# Patient Record
Sex: FEMALE | Race: OTHER | Employment: OTHER | URBAN - METROPOLITAN AREA
[De-identification: names, ages, dates, MRNs, and addresses within clinical notes are randomized per-mention and may not be internally consistent; named-entity substitution may affect disease eponyms.]

---

## 2018-08-07 ENCOUNTER — HOSPITAL ENCOUNTER (OUTPATIENT)
Age: 63
Discharge: HOME | End: 2018-08-07
Attending: FAMILY MEDICINE
Payer: COMMERCIAL

## 2018-08-07 ENCOUNTER — APPOINTMENT (OUTPATIENT)
Dept: GENERAL RADIOLOGY | Age: 63
End: 2018-08-07
Attending: FAMILY MEDICINE
Payer: COMMERCIAL

## 2018-08-07 VITALS
SYSTOLIC BLOOD PRESSURE: 152 MMHG | DIASTOLIC BLOOD PRESSURE: 58 MMHG | TEMPERATURE: 98 F | HEART RATE: 66 BPM | OXYGEN SATURATION: 98 %

## 2018-08-07 DIAGNOSIS — R03.0 ELEVATED BLOOD PRESSURE READING: ICD-10-CM

## 2018-08-07 DIAGNOSIS — R20.2 PARESTHESIAS: Primary | ICD-10-CM

## 2018-08-07 DIAGNOSIS — F43.22 ADJUSTMENT DISORDER WITH ANXIETY: ICD-10-CM

## 2018-08-07 LAB
#LYMPHOCYTE IC: 2.5 X10ˆ3/UL (ref 0.9–3.2)
#MXD IC: 0.4 X10ˆ3/UL (ref 0.1–1)
#NEUTROPHIL IC: 3.7 X10ˆ3/UL (ref 1.3–6.7)
CREAT SERPL-MCNC: 0.6 MG/DL (ref 0.55–1.02)
DDIMER WHOLE BLOOD: <100 NG/ML DDU (ref ?–400)
GLUCOSE BLD-MCNC: 101 MG/DL (ref 70–99)
HCT IC: 37.3 % (ref 37–54)
HGB IC: 12.7 G/DL (ref 11.7–16)
ISTAT BUN: 10 MG/DL (ref 8–20)
ISTAT CHLORIDE: 100 MMOL/L (ref 101–111)
ISTAT HEMATOCRIT: 36 % (ref 34–50)
ISTAT IONIZED CALCIUM: 1.14 MMOL/L
ISTAT POTASSIUM: 3.2 MMOL/L (ref 3.6–5.1)
ISTAT SODIUM: 138 MMOL/L (ref 136–144)
ISTAT TROPONIN: <0.1 NG/ML (ref ?–0.1)
LYMPHOCYTES NFR BLD AUTO: 37.4 %
MCH IC: 31 PG (ref 27–33.2)
MCHC IC: 34 G/DL (ref 31–37)
MCV IC: 91 FL (ref 81–100)
MIXED CELL %: 5.5 %
NEUTROPHILS NFR BLD AUTO: 57.1 %
PLT IC: 216 X10ˆ3/UL (ref 150–450)
RBC IC: 4.1 X10ˆ6/UL (ref 3.8–5.1)
WBC IC: 6.6 X10ˆ3/UL (ref 4–13)

## 2018-08-07 PROCEDURE — 85025 COMPLETE CBC W/AUTO DIFF WBC: CPT | Performed by: FAMILY MEDICINE

## 2018-08-07 PROCEDURE — 36415 COLL VENOUS BLD VENIPUNCTURE: CPT

## 2018-08-07 PROCEDURE — 93005 ELECTROCARDIOGRAM TRACING: CPT

## 2018-08-07 PROCEDURE — 99204 OFFICE O/P NEW MOD 45 MIN: CPT

## 2018-08-07 PROCEDURE — 85378 FIBRIN DEGRADE SEMIQUANT: CPT | Performed by: FAMILY MEDICINE

## 2018-08-07 PROCEDURE — 93010 ELECTROCARDIOGRAM REPORT: CPT

## 2018-08-07 PROCEDURE — 99205 OFFICE O/P NEW HI 60 MIN: CPT

## 2018-08-07 PROCEDURE — 84484 ASSAY OF TROPONIN QUANT: CPT

## 2018-08-07 PROCEDURE — 80047 BASIC METABLC PNL IONIZED CA: CPT

## 2018-08-07 PROCEDURE — 71046 X-RAY EXAM CHEST 2 VIEWS: CPT | Performed by: FAMILY MEDICINE

## 2018-08-07 RX ORDER — LOSARTAN POTASSIUM AND HYDROCHLOROTHIAZIDE 25; 100 MG/1; MG/1
1 TABLET ORAL DAILY
Status: ON HOLD | COMMUNITY
End: 2020-07-04

## 2018-08-07 RX ORDER — ATORVASTATIN CALCIUM 20 MG/1
20 TABLET, FILM COATED ORAL NIGHTLY
COMMUNITY
End: 2021-07-21

## 2018-08-07 RX ORDER — SODIUM CHLORIDE 9 MG/ML
1000 INJECTION, SOLUTION INTRAVENOUS ONCE
Status: COMPLETED | OUTPATIENT
Start: 2018-08-07 | End: 2018-08-07

## 2018-08-07 RX ORDER — ONDANSETRON 4 MG/1
4 TABLET, ORALLY DISINTEGRATING ORAL ONCE
Status: COMPLETED | OUTPATIENT
Start: 2018-08-07 | End: 2018-08-07

## 2018-08-07 RX ORDER — POTASSIUM CHLORIDE 1500 MG/1
20 TABLET, FILM COATED, EXTENDED RELEASE ORAL DAILY
Qty: 5 TABLET | Refills: 0 | Status: SHIPPED | OUTPATIENT
Start: 2018-08-07 | End: 2018-08-12

## 2018-08-07 RX ORDER — POTASSIUM CHLORIDE 20 MEQ/1
40 TABLET, EXTENDED RELEASE ORAL ONCE
Status: COMPLETED | OUTPATIENT
Start: 2018-08-07 | End: 2018-08-07

## 2018-08-07 RX ORDER — ONDANSETRON 4 MG/1
4 TABLET, FILM COATED ORAL EVERY 6 HOURS PRN
Qty: 12 TABLET | Refills: 0 | Status: SHIPPED | OUTPATIENT
Start: 2018-08-07 | End: 2018-08-10

## 2018-08-07 NOTE — ED PROVIDER NOTES
Patient Seen in: [facility]    History   Patient presents with:  Blood Pressure  Nausea    Stated Complaint: high blood pressure    HPI  This is a 65-year-old female-Azeri-speaking only,  helping with the interview, Edward Zavala ) 176/80  Pulse: 79  Resp: n/a  Temp: 98.1 °F (36.7 °C)  Temp src: Oral  SpO2: 98 %  O2 Device: None (Room air)    Current:/58   Pulse 66   Temp 98.1 °F (36.7 °C) (Oral)   SpO2 98%         Physical Exam    General appearance: alert, appears stated Lisa                            High blood  Pressure - pt takes blood pressure medication x 10 yrs now. Pt takes blood pressure at home has  Been getting reading of               168/70,157/70.   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 FINDINGS:  LUNGS:  No focal consolidation. Normal vascularity. CARDIAC:  Normal size cardiac silhouette.  MEDIASTINUM:  Normal. PLEURA:  Normal.  No pl

## 2018-08-07 NOTE — ED INITIAL ASSESSMENT (HPI)
Omani speaking only-- interperter : Lisa    High blood  Pressure - pt takes blood pressure medication x 10 yrs now. Pt takes blood pressure at home has  Been getting reading of 168/70,157/70.   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

## 2018-08-07 NOTE — ED NOTES
kdur given, IV infusing well. Warm blanket  provided. Pt made comfortable. Bed on mid fowlers. Lights dimmed. 1 side rail up. Call light within reach.   Family at bedside

## 2018-08-08 LAB
ATRIAL RATE: 68 BPM
P AXIS: 23 DEGREES
P-R INTERVAL: 158 MS
Q-T INTERVAL: 424 MS
QRS DURATION: 80 MS
QTC CALCULATION (BEZET): 450 MS
R AXIS: 10 DEGREES
T AXIS: 17 DEGREES
VENTRICULAR RATE: 68 BPM

## 2019-02-20 ENCOUNTER — APPOINTMENT (OUTPATIENT)
Dept: GENERAL RADIOLOGY | Age: 64
End: 2019-02-20
Attending: EMERGENCY MEDICINE
Payer: COMMERCIAL

## 2019-02-20 ENCOUNTER — HOSPITAL ENCOUNTER (EMERGENCY)
Age: 64
Discharge: HOME | End: 2019-02-20
Attending: EMERGENCY MEDICINE
Payer: COMMERCIAL

## 2019-02-20 VITALS
DIASTOLIC BLOOD PRESSURE: 65 MMHG | SYSTOLIC BLOOD PRESSURE: 146 MMHG | RESPIRATION RATE: 16 BRPM | HEART RATE: 68 BPM | WEIGHT: 154 LBS | TEMPERATURE: 98 F | OXYGEN SATURATION: 99 %

## 2019-02-20 DIAGNOSIS — S39.012A STRAIN OF LUMBAR REGION, INITIAL ENCOUNTER: Primary | ICD-10-CM

## 2019-02-20 PROCEDURE — 72110 X-RAY EXAM L-2 SPINE 4/>VWS: CPT | Performed by: EMERGENCY MEDICINE

## 2019-02-20 PROCEDURE — 99284 EMERGENCY DEPT VISIT MOD MDM: CPT

## 2019-02-20 PROCEDURE — 99283 EMERGENCY DEPT VISIT LOW MDM: CPT

## 2019-02-20 RX ORDER — CYCLOBENZAPRINE HCL 10 MG
10 TABLET ORAL 3 TIMES DAILY PRN
Qty: 20 TABLET | Refills: 0 | Status: SHIPPED | OUTPATIENT
Start: 2019-02-20 | End: 2019-02-27

## 2019-02-20 RX ORDER — CYCLOBENZAPRINE HCL 10 MG
10 TABLET ORAL ONCE
Status: COMPLETED | OUTPATIENT
Start: 2019-02-20 | End: 2019-02-20

## 2019-02-20 RX ORDER — NAPROXEN 500 MG/1
500 TABLET ORAL 2 TIMES DAILY PRN
Qty: 20 TABLET | Refills: 0 | Status: SHIPPED | OUTPATIENT
Start: 2019-02-20 | End: 2019-02-27

## 2019-02-20 NOTE — ED PROVIDER NOTES
Patient Seen in: THE [facility] Emergency Department In [place]    History   Patient presents with:  Back Pain (musculoskeletal)    Stated Complaint: back pain     HPI    Patient is a 59-year-old female who presents with family for evaluation of diffuse low b normal and breath sounds normal.   Abdominal: Soft. Bowel sounds are normal.   Musculoskeletal: Normal range of motion. Mild diffuse tenderness palpation about the lumbar paraspinal muscles. No midline step-off or deformity.    Neurological: She is alert needed.   Qty: 20 tablet Refills: 0

## 2019-11-18 ENCOUNTER — APPOINTMENT (OUTPATIENT)
Dept: GENERAL RADIOLOGY | Facility: HOSPITAL | Age: 64
End: 2019-11-18
Attending: EMERGENCY MEDICINE
Payer: COMMERCIAL

## 2019-11-18 ENCOUNTER — HOSPITAL ENCOUNTER (EMERGENCY)
Facility: HOSPITAL | Age: 64
Discharge: HOME | End: 2019-11-18
Attending: EMERGENCY MEDICINE
Payer: COMMERCIAL

## 2019-11-18 ENCOUNTER — APPOINTMENT (OUTPATIENT)
Dept: CT IMAGING | Facility: HOSPITAL | Age: 64
End: 2019-11-18
Attending: EMERGENCY MEDICINE
Payer: COMMERCIAL

## 2019-11-18 VITALS
HEIGHT: 61 IN | WEIGHT: 150 LBS | SYSTOLIC BLOOD PRESSURE: 151 MMHG | HEART RATE: 63 BPM | BODY MASS INDEX: 28.32 KG/M2 | TEMPERATURE: 98 F | DIASTOLIC BLOOD PRESSURE: 69 MMHG | OXYGEN SATURATION: 95 % | RESPIRATION RATE: 16 BRPM

## 2019-11-18 DIAGNOSIS — M54.12 CERVICAL RADICULOPATHY: Primary | ICD-10-CM

## 2019-11-18 PROCEDURE — 99285 EMERGENCY DEPT VISIT HI MDM: CPT

## 2019-11-18 PROCEDURE — 70450 CT HEAD/BRAIN W/O DYE: CPT | Performed by: EMERGENCY MEDICINE

## 2019-11-18 PROCEDURE — 84484 ASSAY OF TROPONIN QUANT: CPT | Performed by: EMERGENCY MEDICINE

## 2019-11-18 PROCEDURE — 93010 ELECTROCARDIOGRAM REPORT: CPT

## 2019-11-18 PROCEDURE — 72050 X-RAY EXAM NECK SPINE 4/5VWS: CPT | Performed by: EMERGENCY MEDICINE

## 2019-11-18 PROCEDURE — 80053 COMPREHEN METABOLIC PANEL: CPT | Performed by: EMERGENCY MEDICINE

## 2019-11-18 PROCEDURE — 93005 ELECTROCARDIOGRAM TRACING: CPT

## 2019-11-18 PROCEDURE — 96374 THER/PROPH/DIAG INJ IV PUSH: CPT

## 2019-11-18 PROCEDURE — 85025 COMPLETE CBC W/AUTO DIFF WBC: CPT | Performed by: EMERGENCY MEDICINE

## 2019-11-18 RX ORDER — KETOROLAC TROMETHAMINE 30 MG/ML
15 INJECTION, SOLUTION INTRAMUSCULAR; INTRAVENOUS ONCE
Status: COMPLETED | OUTPATIENT
Start: 2019-11-18 | End: 2019-11-18

## 2019-11-18 RX ORDER — TRAMADOL HYDROCHLORIDE 50 MG/1
TABLET ORAL EVERY 6 HOURS PRN
Qty: 10 TABLET | Refills: 0 | Status: SHIPPED | OUTPATIENT
Start: 2019-11-18 | End: 2019-11-25

## 2019-11-18 NOTE — ED PROVIDER NOTES
Patient Seen in: [facility] Emergency Department      History   Patient presents with:  Pain (neurologic)    Stated Complaint: pain to left jaw since Waldron Escort is constant, pain never goes away.   seen by*    HPI    And is a pleasant 76-year-old H SpO2 95%   BMI 28.34 kg/m²         Physical Exam    Alert and oriented patient appears in no distress HEENT exam is normal lungs are clear cardiovascular exam shows regular rhythm abdomen soft nontender extremities no cyanosis or edema no vertebral tendern Marget Babinski, [address]   [facility] [address] [phone]    In 3 days          Medications Prescribed:  Current Discharge Medication List    START taking these medications    traMADol HCl 50 MG Oral Tab  Take 1-2 tablets ( mg total) by mouth every 6

## 2019-11-18 NOTE — ED NOTES
Patient taken to CT department at this time by cart by PCT. Will try to facilitate patient going directly from CT to xray.

## 2019-11-18 NOTE — ED NOTES
After CT scan, patient with increase left neck pain, warm pack applied to left neck area. Patient tolerated well.

## 2019-11-18 NOTE — ED INITIAL ASSESSMENT (HPI)
Pt with left sided back of head/ neck pain, radiates down my left arm. Sensation left sided jaw feels seperated, symptoms for last month. Today pain more severe. When pain is severe pt feels nauseous, no vomiting.

## 2019-11-18 NOTE — ED NOTES
For past month or so, patient with back of neck left side going down left shoulder all the way to all left fingers with numbness and tingling. No numbness or tingling now. Today with left side neck and left shoulder pain.   Also, occasional left jaw pain,

## 2019-11-26 ENCOUNTER — APPOINTMENT (OUTPATIENT)
Dept: GENERAL RADIOLOGY | Age: 64
End: 2019-11-26
Payer: COMMERCIAL

## 2019-11-26 ENCOUNTER — HOSPITAL ENCOUNTER (OUTPATIENT)
Age: 64
Discharge: HOME | End: 2019-11-26
Payer: COMMERCIAL

## 2019-11-26 VITALS
HEART RATE: 78 BPM | SYSTOLIC BLOOD PRESSURE: 146 MMHG | RESPIRATION RATE: 20 BRPM | TEMPERATURE: 99 F | OXYGEN SATURATION: 98 % | DIASTOLIC BLOOD PRESSURE: 78 MMHG

## 2019-11-26 DIAGNOSIS — S93.402A MODERATE LEFT ANKLE SPRAIN, INITIAL ENCOUNTER: Primary | ICD-10-CM

## 2019-11-26 PROCEDURE — 99213 OFFICE O/P EST LOW 20 MIN: CPT

## 2019-11-26 PROCEDURE — 73610 X-RAY EXAM OF ANKLE: CPT

## 2019-11-26 PROCEDURE — 73630 X-RAY EXAM OF FOOT: CPT

## 2019-11-26 PROCEDURE — 99214 OFFICE O/P EST MOD 30 MIN: CPT

## 2019-11-26 RX ORDER — TRAMADOL HYDROCHLORIDE 50 MG/1
50 TABLET ORAL EVERY 6 HOURS PRN
COMMUNITY
End: 2020-06-11

## 2019-11-26 NOTE — ED INITIAL ASSESSMENT (HPI)
Pt was going down step ladder, slipped and fell to ground; pain and swelling to L ankle; pt had surgery to L ankle a few years ago; also with pain to tailbone; pt hit head slightly but no pain/swelling/loc/vom

## 2019-11-27 NOTE — ED PROVIDER NOTES
Patient Seen in: [facility] In [facility] END      History   Patient presents with:  Lower Extremity Injury (musculoskeletal)    Stated Complaint: LEFT ANKLE INJURY S/P FALL TODAY    AG    Rose Armando is a 43-year-old female who presents today for evalu SpO2 98 %   O2 Device None (Room air)       Current:/78   Pulse 78   Temp 98.7 °F (37.1 °C) (Temporal)   Resp 20   SpO2 98%         Physical Exam  Nursing note reviewed. Vital signs reviewed.   General: A&O x 3; well-developed; well-nourished; no ac described above. 2. Mild underlying tibiotalar joint osteoarthritis. 3. Mild calcaneal enthesopathy. 4. No acute process is identified. Dictated by: Donny Schilder, DO on 11/26/2019 at 17:44     Approved by:  Donny Schilder, DO on 11/26/2019 at 17:46 MD [address]   [facility] [address]  [address] [facility], [address] [place] [address]  Idris Ly [facility] [address]  [phone]              Medications Prescribed:  Discharge Medication List as of 11/26/2019  6:26 PM

## 2019-12-04 ENCOUNTER — HOSPITAL ENCOUNTER (OUTPATIENT)
Dept: MRI IMAGING | Age: 64
Discharge: HOME | End: 2019-12-04
Attending: NEUROLOGICAL SURGERY
Payer: COMMERCIAL

## 2019-12-04 DIAGNOSIS — M50.220 OTHER CERVICAL DISC DISPLACEMENT, MID-CERVICAL REGION, UNSPECIFIED LEVEL: ICD-10-CM

## 2019-12-04 DIAGNOSIS — M54.2 CERVICALGIA: ICD-10-CM

## 2019-12-04 PROCEDURE — 72141 MRI NECK SPINE W/O DYE: CPT | Performed by: NEUROLOGICAL SURGERY

## 2020-03-02 ENCOUNTER — HOSPITAL ENCOUNTER (OUTPATIENT)
Dept: CT IMAGING | Age: 65
Discharge: HOME | End: 2020-03-02
Attending: NEUROLOGICAL SURGERY
Payer: COMMERCIAL

## 2020-03-02 DIAGNOSIS — M47.22 CERVICAL SPONDYLOSIS WITH RADICULOPATHY: ICD-10-CM

## 2020-03-02 PROCEDURE — 72125 CT NECK SPINE W/O DYE: CPT | Performed by: NEUROLOGICAL SURGERY

## 2020-06-12 ENCOUNTER — APPOINTMENT (OUTPATIENT)
Dept: LAB | Age: 65
End: 2020-06-12
Attending: NEUROLOGICAL SURGERY
Payer: COMMERCIAL

## 2020-06-12 DIAGNOSIS — Z01.818 PREOP TESTING: ICD-10-CM

## 2020-06-12 PROCEDURE — 87641 MR-STAPH DNA AMP PROBE: CPT

## 2020-06-13 ENCOUNTER — LAB ENCOUNTER (OUTPATIENT)
Dept: LAB | Facility: HOSPITAL | Age: 65
End: 2020-06-13
Attending: NEUROLOGICAL SURGERY
Payer: COMMERCIAL

## 2020-06-13 DIAGNOSIS — Z01.818 PREOP TESTING: ICD-10-CM

## 2020-06-15 ENCOUNTER — ANESTHESIA (OUTPATIENT)
Dept: SURGERY | Facility: HOSPITAL | Age: 65
End: 2020-06-15
Payer: COMMERCIAL

## 2020-06-15 ENCOUNTER — ANESTHESIA EVENT (OUTPATIENT)
Dept: SURGERY | Facility: HOSPITAL | Age: 65
End: 2020-06-15
Payer: COMMERCIAL

## 2020-06-15 ENCOUNTER — HOSPITAL ENCOUNTER (OUTPATIENT)
Facility: HOSPITAL | Age: 65
Discharge: HOME | End: 2020-06-16
Attending: NEUROLOGICAL SURGERY | Admitting: NEUROLOGICAL SURGERY
Payer: COMMERCIAL

## 2020-06-15 ENCOUNTER — APPOINTMENT (OUTPATIENT)
Dept: GENERAL RADIOLOGY | Facility: HOSPITAL | Age: 65
End: 2020-06-15
Attending: NEUROLOGICAL SURGERY
Payer: COMMERCIAL

## 2020-06-15 DIAGNOSIS — Z01.818 PREOP TESTING: Primary | ICD-10-CM

## 2020-06-15 PROBLEM — E78.5 HYPERLIPIDEMIA: Chronic | Status: ACTIVE | Noted: 2020-06-15

## 2020-06-15 PROBLEM — I10 ESSENTIAL HYPERTENSION: Chronic | Status: ACTIVE | Noted: 2020-06-15

## 2020-06-15 PROBLEM — M54.12 CERVICAL RADICULOPATHY: Status: ACTIVE | Noted: 2020-06-15

## 2020-06-15 PROCEDURE — 0RG10K1 FUSION OF CERVICAL VERTEBRAL JOINT WITH NONAUTOLOGOUS TISSUE SUBSTITUTE, POSTERIOR APPROACH, POSTERIOR COLUMN, OPEN APPROACH: ICD-10-PCS | Performed by: NEUROLOGICAL SURGERY

## 2020-06-15 PROCEDURE — 99202 OFFICE O/P NEW SF 15 MIN: CPT | Performed by: HOSPITALIST

## 2020-06-15 PROCEDURE — 76000 FLUOROSCOPY <1 HR PHYS/QHP: CPT | Performed by: NEUROLOGICAL SURGERY

## 2020-06-15 PROCEDURE — 0RG10AJ FUSION OF CERVICAL VERTEBRAL JOINT WITH INTERBODY FUSION DEVICE, POSTERIOR APPROACH, ANTERIOR COLUMN, OPEN APPROACH: ICD-10-PCS | Performed by: NEUROLOGICAL SURGERY

## 2020-06-15 DEVICE — OSTEOCEL PRO SMALL: Type: IMPLANTABLE DEVICE | Site: SPINE CERVICAL | Status: FUNCTIONAL

## 2020-06-15 DEVICE — THE ALLY BONE SCREW IS A SINGLE-USE IMPLANT MADE OF TITANIUM ALLOY AND USED IN BONE RECONSTRUCTION, OSTEOTOMY, ARTHRODESIS, JOIN FUSION, FRACTURE REPAIR AND FIXATION APPROPRIATE FOR THE SIZE OF THE DEVICE.
Type: IMPLANTABLE DEVICE | Site: SPINE CERVICAL | Status: FUNCTIONAL
Brand: ALLY BONE SCREW

## 2020-06-15 DEVICE — THE DTRAX CERVICAL CAGE-B IS A SINGLE-USE, TITANIUM ALLOY INTERVERTEBRAL IMPLANT AVAILABLE IN VARIOUS FOOTPRINTS AND HEIGHTS. IT IS INTENDED TO BE USED IN CERVICAL SPINAL FUSION SURGERY FOR SKELETALLY MATURE PATIENTS WITH DEGENERATIVE DISC DISEASE OF THE CERVICAL SPINE WITH ACCOMPANYING RADICULAR SYMPTOMS AT ONE DISC LEVEL.
Type: IMPLANTABLE DEVICE | Site: SPINE CERVICAL | Status: FUNCTIONAL
Brand: CAVUX  CERVICAL CAGE-B

## 2020-06-15 RX ORDER — EPHEDRINE SULFATE 50 MG/ML
INJECTION, SOLUTION INTRAVENOUS AS NEEDED
Status: DISCONTINUED | OUTPATIENT
Start: 2020-06-15 | End: 2020-06-15 | Stop reason: SURG

## 2020-06-15 RX ORDER — MIDAZOLAM HYDROCHLORIDE 1 MG/ML
INJECTION INTRAMUSCULAR; INTRAVENOUS AS NEEDED
Status: DISCONTINUED | OUTPATIENT
Start: 2020-06-15 | End: 2020-06-15 | Stop reason: SURG

## 2020-06-15 RX ORDER — HALOPERIDOL 5 MG/ML
0.25 INJECTION INTRAMUSCULAR ONCE AS NEEDED
Status: DISCONTINUED | OUTPATIENT
Start: 2020-06-15 | End: 2020-06-15 | Stop reason: HOSPADM

## 2020-06-15 RX ORDER — ONDANSETRON 2 MG/ML
4 INJECTION INTRAMUSCULAR; INTRAVENOUS EVERY 4 HOURS PRN
Status: DISPENSED | OUTPATIENT
Start: 2020-06-15 | End: 2020-06-16

## 2020-06-15 RX ORDER — ONDANSETRON 2 MG/ML
4 INJECTION INTRAMUSCULAR; INTRAVENOUS ONCE AS NEEDED
Status: DISCONTINUED | OUTPATIENT
Start: 2020-06-15 | End: 2020-06-15 | Stop reason: HOSPADM

## 2020-06-15 RX ORDER — ROCURONIUM BROMIDE 10 MG/ML
INJECTION, SOLUTION INTRAVENOUS AS NEEDED
Status: DISCONTINUED | OUTPATIENT
Start: 2020-06-15 | End: 2020-06-15 | Stop reason: SURG

## 2020-06-15 RX ORDER — MORPHINE SULFATE 15 MG/1
15 TABLET ORAL EVERY 4 HOURS PRN
Status: DISCONTINUED | OUTPATIENT
Start: 2020-06-15 | End: 2020-06-16

## 2020-06-15 RX ORDER — LIDOCAINE HYDROCHLORIDE 10 MG/ML
INJECTION, SOLUTION EPIDURAL; INFILTRATION; INTRACAUDAL; PERINEURAL AS NEEDED
Status: DISCONTINUED | OUTPATIENT
Start: 2020-06-15 | End: 2020-06-15 | Stop reason: SURG

## 2020-06-15 RX ORDER — NALOXONE HYDROCHLORIDE 0.4 MG/ML
80 INJECTION, SOLUTION INTRAMUSCULAR; INTRAVENOUS; SUBCUTANEOUS AS NEEDED
Status: DISCONTINUED | OUTPATIENT
Start: 2020-06-15 | End: 2020-06-15 | Stop reason: HOSPADM

## 2020-06-15 RX ORDER — CEFAZOLIN SODIUM/WATER 2 G/20 ML
2 SYRINGE (ML) INTRAVENOUS EVERY 8 HOURS
Status: DISCONTINUED | OUTPATIENT
Start: 2020-06-15 | End: 2020-06-15 | Stop reason: HOSPADM

## 2020-06-15 RX ORDER — METOCLOPRAMIDE HYDROCHLORIDE 5 MG/ML
10 INJECTION INTRAMUSCULAR; INTRAVENOUS EVERY 6 HOURS PRN
Status: DISCONTINUED | OUTPATIENT
Start: 2020-06-15 | End: 2020-06-16

## 2020-06-15 RX ORDER — HYDROMORPHONE HYDROCHLORIDE 1 MG/ML
0.8 INJECTION, SOLUTION INTRAMUSCULAR; INTRAVENOUS; SUBCUTANEOUS EVERY 2 HOUR PRN
Status: DISCONTINUED | OUTPATIENT
Start: 2020-06-15 | End: 2020-06-16

## 2020-06-15 RX ORDER — FAMOTIDINE 20 MG/1
20 TABLET ORAL ONCE
Status: DISCONTINUED | OUTPATIENT
Start: 2020-06-15 | End: 2020-06-15 | Stop reason: HOSPADM

## 2020-06-15 RX ORDER — HYDROCODONE BITARTRATE AND ACETAMINOPHEN 5; 325 MG/1; MG/1
1 TABLET ORAL AS NEEDED
Status: DISCONTINUED | OUTPATIENT
Start: 2020-06-15 | End: 2020-06-15 | Stop reason: HOSPADM

## 2020-06-15 RX ORDER — ACETAMINOPHEN 500 MG
1000 TABLET ORAL ONCE
Status: COMPLETED | OUTPATIENT
Start: 2020-06-15 | End: 2020-06-15

## 2020-06-15 RX ORDER — ONDANSETRON 2 MG/ML
INJECTION INTRAMUSCULAR; INTRAVENOUS AS NEEDED
Status: DISCONTINUED | OUTPATIENT
Start: 2020-06-15 | End: 2020-06-15 | Stop reason: SURG

## 2020-06-15 RX ORDER — BISACODYL 10 MG
10 SUPPOSITORY, RECTAL RECTAL
Status: DISCONTINUED | OUTPATIENT
Start: 2020-06-15 | End: 2020-06-16

## 2020-06-15 RX ORDER — SODIUM PHOSPHATE, DIBASIC AND SODIUM PHOSPHATE, MONOBASIC 7; 19 G/133ML; G/133ML
1 ENEMA RECTAL ONCE AS NEEDED
Status: DISCONTINUED | OUTPATIENT
Start: 2020-06-15 | End: 2020-06-16

## 2020-06-15 RX ORDER — HYDROMORPHONE HYDROCHLORIDE 1 MG/ML
0.2 INJECTION, SOLUTION INTRAMUSCULAR; INTRAVENOUS; SUBCUTANEOUS EVERY 5 MIN PRN
Status: DISCONTINUED | OUTPATIENT
Start: 2020-06-15 | End: 2020-06-15 | Stop reason: HOSPADM

## 2020-06-15 RX ORDER — DIPHENHYDRAMINE HCL 25 MG
25 CAPSULE ORAL EVERY 4 HOURS PRN
Status: DISCONTINUED | OUTPATIENT
Start: 2020-06-15 | End: 2020-06-16

## 2020-06-15 RX ORDER — DIAPER,BRIEF,INFANT-TODD,DISP
EACH MISCELLANEOUS AS NEEDED
Status: DISCONTINUED | OUTPATIENT
Start: 2020-06-15 | End: 2020-06-15 | Stop reason: HOSPADM

## 2020-06-15 RX ORDER — MORPHINE SULFATE 4 MG/ML
4 INJECTION, SOLUTION INTRAMUSCULAR; INTRAVENOUS EVERY 10 MIN PRN
Status: DISCONTINUED | OUTPATIENT
Start: 2020-06-15 | End: 2020-06-15 | Stop reason: HOSPADM

## 2020-06-15 RX ORDER — DEXAMETHASONE SODIUM PHOSPHATE 4 MG/ML
VIAL (ML) INJECTION AS NEEDED
Status: DISCONTINUED | OUTPATIENT
Start: 2020-06-15 | End: 2020-06-15 | Stop reason: SURG

## 2020-06-15 RX ORDER — HYDROMORPHONE HYDROCHLORIDE 1 MG/ML
0.4 INJECTION, SOLUTION INTRAMUSCULAR; INTRAVENOUS; SUBCUTANEOUS EVERY 5 MIN PRN
Status: DISCONTINUED | OUTPATIENT
Start: 2020-06-15 | End: 2020-06-15 | Stop reason: HOSPADM

## 2020-06-15 RX ORDER — DIPHENHYDRAMINE HYDROCHLORIDE 50 MG/ML
25 INJECTION INTRAMUSCULAR; INTRAVENOUS EVERY 4 HOURS PRN
Status: DISCONTINUED | OUTPATIENT
Start: 2020-06-15 | End: 2020-06-16

## 2020-06-15 RX ORDER — HYDROMORPHONE HYDROCHLORIDE 1 MG/ML
0.6 INJECTION, SOLUTION INTRAMUSCULAR; INTRAVENOUS; SUBCUTANEOUS EVERY 5 MIN PRN
Status: DISCONTINUED | OUTPATIENT
Start: 2020-06-15 | End: 2020-06-15 | Stop reason: HOSPADM

## 2020-06-15 RX ORDER — SODIUM CHLORIDE, SODIUM LACTATE, POTASSIUM CHLORIDE, CALCIUM CHLORIDE 600; 310; 30; 20 MG/100ML; MG/100ML; MG/100ML; MG/100ML
INJECTION, SOLUTION INTRAVENOUS CONTINUOUS
Status: DISCONTINUED | OUTPATIENT
Start: 2020-06-15 | End: 2020-06-15

## 2020-06-15 RX ORDER — HYDROMORPHONE HYDROCHLORIDE 1 MG/ML
0.4 INJECTION, SOLUTION INTRAMUSCULAR; INTRAVENOUS; SUBCUTANEOUS EVERY 2 HOUR PRN
Status: DISCONTINUED | OUTPATIENT
Start: 2020-06-15 | End: 2020-06-16

## 2020-06-15 RX ORDER — SENNOSIDES 8.6 MG
17.2 TABLET ORAL NIGHTLY
Status: DISCONTINUED | OUTPATIENT
Start: 2020-06-15 | End: 2020-06-16

## 2020-06-15 RX ORDER — MORPHINE SULFATE 10 MG/ML
6 INJECTION, SOLUTION INTRAMUSCULAR; INTRAVENOUS EVERY 10 MIN PRN
Status: DISCONTINUED | OUTPATIENT
Start: 2020-06-15 | End: 2020-06-15 | Stop reason: HOSPADM

## 2020-06-15 RX ORDER — DOCUSATE SODIUM 100 MG/1
100 CAPSULE, LIQUID FILLED ORAL 2 TIMES DAILY
Status: DISCONTINUED | OUTPATIENT
Start: 2020-06-15 | End: 2020-06-16

## 2020-06-15 RX ORDER — HYDROMORPHONE HYDROCHLORIDE 1 MG/ML
0.2 INJECTION, SOLUTION INTRAMUSCULAR; INTRAVENOUS; SUBCUTANEOUS EVERY 2 HOUR PRN
Status: DISCONTINUED | OUTPATIENT
Start: 2020-06-15 | End: 2020-06-16

## 2020-06-15 RX ORDER — METOCLOPRAMIDE 10 MG/1
10 TABLET ORAL ONCE
Status: DISCONTINUED | OUTPATIENT
Start: 2020-06-15 | End: 2020-06-15 | Stop reason: HOSPADM

## 2020-06-15 RX ORDER — MORPHINE SULFATE 4 MG/ML
2 INJECTION, SOLUTION INTRAMUSCULAR; INTRAVENOUS EVERY 10 MIN PRN
Status: DISCONTINUED | OUTPATIENT
Start: 2020-06-15 | End: 2020-06-15 | Stop reason: HOSPADM

## 2020-06-15 RX ORDER — ATORVASTATIN CALCIUM 20 MG/1
20 TABLET, FILM COATED ORAL NIGHTLY
Status: DISCONTINUED | OUTPATIENT
Start: 2020-06-15 | End: 2020-06-16

## 2020-06-15 RX ORDER — NEOSTIGMINE METHYLSULFATE 1 MG/ML
INJECTION INTRAVENOUS AS NEEDED
Status: DISCONTINUED | OUTPATIENT
Start: 2020-06-15 | End: 2020-06-15 | Stop reason: SURG

## 2020-06-15 RX ORDER — SODIUM CHLORIDE, SODIUM LACTATE, POTASSIUM CHLORIDE, CALCIUM CHLORIDE 600; 310; 30; 20 MG/100ML; MG/100ML; MG/100ML; MG/100ML
INJECTION, SOLUTION INTRAVENOUS CONTINUOUS
Status: DISCONTINUED | OUTPATIENT
Start: 2020-06-15 | End: 2020-06-15 | Stop reason: HOSPADM

## 2020-06-15 RX ORDER — GLYCOPYRROLATE 0.2 MG/ML
INJECTION, SOLUTION INTRAMUSCULAR; INTRAVENOUS AS NEEDED
Status: DISCONTINUED | OUTPATIENT
Start: 2020-06-15 | End: 2020-06-15 | Stop reason: SURG

## 2020-06-15 RX ORDER — HYDROCODONE BITARTRATE AND ACETAMINOPHEN 5; 325 MG/1; MG/1
2 TABLET ORAL AS NEEDED
Status: DISCONTINUED | OUTPATIENT
Start: 2020-06-15 | End: 2020-06-15 | Stop reason: HOSPADM

## 2020-06-15 RX ORDER — OXYCODONE HYDROCHLORIDE AND ACETAMINOPHEN 5; 325 MG/1; MG/1
1 TABLET ORAL EVERY 4 HOURS PRN
Status: DISCONTINUED | OUTPATIENT
Start: 2020-06-15 | End: 2020-06-16

## 2020-06-15 RX ORDER — POLYETHYLENE GLYCOL 3350 17 G/17G
17 POWDER, FOR SOLUTION ORAL DAILY PRN
Status: DISCONTINUED | OUTPATIENT
Start: 2020-06-15 | End: 2020-06-16

## 2020-06-15 RX ORDER — PROCHLORPERAZINE EDISYLATE 5 MG/ML
5 INJECTION INTRAMUSCULAR; INTRAVENOUS ONCE AS NEEDED
Status: DISCONTINUED | OUTPATIENT
Start: 2020-06-15 | End: 2020-06-15 | Stop reason: HOSPADM

## 2020-06-15 RX ORDER — BUPIVACAINE HYDROCHLORIDE 2.5 MG/ML
INJECTION, SOLUTION EPIDURAL; INFILTRATION; INTRACAUDAL AS NEEDED
Status: DISCONTINUED | OUTPATIENT
Start: 2020-06-15 | End: 2020-06-15 | Stop reason: HOSPADM

## 2020-06-15 RX ORDER — SODIUM CHLORIDE 9 MG/ML
INJECTION, SOLUTION INTRAVENOUS CONTINUOUS
Status: DISCONTINUED | OUTPATIENT
Start: 2020-06-15 | End: 2020-06-16

## 2020-06-15 RX ORDER — OXYCODONE HYDROCHLORIDE AND ACETAMINOPHEN 5; 325 MG/1; MG/1
2 TABLET ORAL EVERY 4 HOURS PRN
Status: DISCONTINUED | OUTPATIENT
Start: 2020-06-15 | End: 2020-06-16

## 2020-06-15 RX ADMIN — DEXAMETHASONE SODIUM PHOSPHATE 6 MG: 4 MG/ML VIAL (ML) INJECTION at 11:03:00

## 2020-06-15 RX ADMIN — DEXAMETHASONE SODIUM PHOSPHATE 4 MG: 4 MG/ML VIAL (ML) INJECTION at 07:39:00

## 2020-06-15 RX ADMIN — EPHEDRINE SULFATE 5 MG: 50 INJECTION, SOLUTION INTRAVENOUS at 08:25:00

## 2020-06-15 RX ADMIN — ONDANSETRON 4 MG: 2 INJECTION INTRAMUSCULAR; INTRAVENOUS at 07:39:00

## 2020-06-15 RX ADMIN — EPHEDRINE SULFATE 5 MG: 50 INJECTION, SOLUTION INTRAVENOUS at 10:43:00

## 2020-06-15 RX ADMIN — ROCURONIUM BROMIDE 10 MG: 10 INJECTION, SOLUTION INTRAVENOUS at 08:43:00

## 2020-06-15 RX ADMIN — SODIUM CHLORIDE, SODIUM LACTATE, POTASSIUM CHLORIDE, CALCIUM CHLORIDE: 600; 310; 30; 20 INJECTION, SOLUTION INTRAVENOUS at 11:37:00

## 2020-06-15 RX ADMIN — ROCURONIUM BROMIDE 10 MG: 10 INJECTION, SOLUTION INTRAVENOUS at 09:52:00

## 2020-06-15 RX ADMIN — LIDOCAINE HYDROCHLORIDE 50 MG: 10 INJECTION, SOLUTION EPIDURAL; INFILTRATION; INTRACAUDAL; PERINEURAL at 07:39:00

## 2020-06-15 RX ADMIN — GLYCOPYRROLATE 0.8 MG: 0.2 INJECTION, SOLUTION INTRAMUSCULAR; INTRAVENOUS at 11:14:00

## 2020-06-15 RX ADMIN — CEFAZOLIN SODIUM/WATER 2 G: 2 G/20 ML SYRINGE (ML) INTRAVENOUS at 07:49:00

## 2020-06-15 RX ADMIN — GLYCOPYRROLATE 0.2 MG: 0.2 INJECTION, SOLUTION INTRAMUSCULAR; INTRAVENOUS at 07:39:00

## 2020-06-15 RX ADMIN — MIDAZOLAM HYDROCHLORIDE 2 MG: 1 INJECTION INTRAMUSCULAR; INTRAVENOUS at 07:37:00

## 2020-06-15 RX ADMIN — ROCURONIUM BROMIDE 10 MG: 10 INJECTION, SOLUTION INTRAVENOUS at 10:40:00

## 2020-06-15 RX ADMIN — NEOSTIGMINE METHYLSULFATE 5 MG: 1 INJECTION INTRAVENOUS at 11:14:00

## 2020-06-15 RX ADMIN — ROCURONIUM BROMIDE 50 MG: 10 INJECTION, SOLUTION INTRAVENOUS at 07:39:00

## 2020-06-15 RX ADMIN — EPHEDRINE SULFATE 5 MG: 50 INJECTION, SOLUTION INTRAVENOUS at 08:27:00

## 2020-06-15 RX ADMIN — SODIUM CHLORIDE, SODIUM LACTATE, POTASSIUM CHLORIDE, CALCIUM CHLORIDE: 600; 310; 30; 20 INJECTION, SOLUTION INTRAVENOUS at 10:53:00

## 2020-06-15 NOTE — ANESTHESIA PREPROCEDURE EVALUATION
Anesthesia PreOp Note    HPI:     Paula Muller is a 76year old female who presents for preoperative consultation requested by: Ancelmo Tanner MD    Date of Surgery: 6/15/2020    Procedure(s):  POSTERIOR CERVICAL FUSION BG 1 LEVEL  Indication: spondy Number of children: Not on file      Years of education: Not on file      Highest education level: Not on file    Occupational History      Not on file    Social Needs      Financial resource strain: Not on file      Food insecurity:        Worry: Not on II  TM distance: >3 FB  Neck ROM: full  Dental - normal exam     Pulmonary - normal exam   Cardiovascular - normal exam  (+) hypertension,     ECG reviewed    Neuro/Psych      GI/Hepatic/Renal      Endo/Other    (+) arthritis  Abdominal                Anes

## 2020-06-15 NOTE — ANESTHESIA PROCEDURE NOTES
Peripheral IV  Date/Time: 6/15/2020 7:45 AM  Inserted by: Rhonda Figueroa CRNA    Placement  Needle size: 20 G  Laterality: left  Location: hand  Local anesthetic: none  Site prep: alcohol  Technique: anatomical landmarks  Attempts: 1

## 2020-06-15 NOTE — PROGRESS NOTES
[facility] HOSP - [facility]    Progress Note    [place] Han Patient Status:  Hospital Outpatient Surgery    1955 MRN [id number]   Location [address] UNIT Attending Salud Michaud MD   [address] Day # 0 PCP Olvin Womack Results:     Lab Results   Component Value Date    WBC 5.4 11/18/2019    HGB 12.6 11/18/2019    HCT 36.0 11/18/2019    .0 11/18/2019    CREATSERUM 0.88 11/18/2019    BUN 15 11/18/2019     (L) 11/18/2019    K 3.5 11/18/2019     11/18/20

## 2020-06-15 NOTE — ANESTHESIA POSTPROCEDURE EVALUATION
Patient: Jeff Black    Procedure Summary     Date:  06/15/20 Room / Location:  [facility] OR  / [facility] OR    Anesthesia Start:  5144 Anesthesia Stop:      Procedure:  POSTERIOR CERVICAL FUSION BG 1 LEVEL (N/A Spine Cervical) Diagnosis:  (spondylolisthe

## 2020-06-15 NOTE — OPERATIVE REPORT
[facility]    PATIENT'S NAME: [email or web address], XIMENA GRANADOS   ATTENDING PHYSICIAN: Aristides Adams MD   OPERATING PHYSICIAN: Jax Lynch MD   PATIENT ACCOUNT#:   [id number]    LOCATION:  [facility] [address] PACU 3 [facility] 10  MEDICAL RECORD #:   [id number] her questions were answered. OPERATIVE TECHNIQUE:  Under general endotracheal anesthesia in the prone position, she was placed on the regular table and Thom frame.   Initially, we thought we could use the pins but these would be in the way for the AP v laminar space.   We then applied a drain; we applied Hemovac drains to both sides and closed the wound in layers using #1 Vicryl to close the fascia using simple interrupted stitches and then 2-0 Vicryl to close the subcutaneous tissue using also simple int

## 2020-06-15 NOTE — INTERVAL H&P NOTE
Pre-op Diagnosis: spondylolisthesis cervical    The above referenced H&P was reviewed by Norman Yang MD on 6/15/2020, the patient was examined and no significant changes have occurred in the patient's condition since the H&P was performed.   I discu

## 2020-06-15 NOTE — ANESTHESIA PROCEDURE NOTES
Airway  Date/Time: 6/15/2020 7:42 AM  Urgency: Elective    Airway not difficult    General Information and Staff    Patient location during procedure: OR  Anesthesiologist: David Torres MD  Resident/CRNA: Pam Ring CRNA  Performed: CRNA     Indic

## 2020-06-15 NOTE — BRIEF OP NOTE
Pre-Operative Diagnosis: spondylolisthesis cervical C3-4     Post-Operative Diagnosis: spondylolisthesis cervical C3-4     Procedure Performed: C3-4 posterior facet fusion with facet cage and bone graft on the right and bone graft on the left    Surgeon(s)

## 2020-06-16 VITALS
WEIGHT: 155 LBS | SYSTOLIC BLOOD PRESSURE: 133 MMHG | RESPIRATION RATE: 18 BRPM | OXYGEN SATURATION: 96 % | TEMPERATURE: 98 F | HEIGHT: 61 IN | BODY MASS INDEX: 29.27 KG/M2 | DIASTOLIC BLOOD PRESSURE: 61 MMHG | HEART RATE: 66 BPM

## 2020-06-16 PROCEDURE — 99213 OFFICE O/P EST LOW 20 MIN: CPT | Performed by: HOSPITALIST

## 2020-06-16 RX ORDER — POLYETHYLENE GLYCOL 3350 17 G/17G
17 POWDER, FOR SOLUTION ORAL 2 TIMES DAILY PRN
Status: DISCONTINUED | OUTPATIENT
Start: 2020-06-16 | End: 2020-06-16

## 2020-06-16 RX ORDER — SENNA PLUS 8.6 MG/1
8.6 TABLET ORAL 2 TIMES DAILY
Qty: 14 TABLET | Refills: 0 | Status: SHIPPED | OUTPATIENT
Start: 2020-06-16 | End: 2020-11-09

## 2020-06-16 RX ORDER — SENNOSIDES 8.6 MG
8.6 TABLET ORAL 2 TIMES DAILY
Status: DISCONTINUED | OUTPATIENT
Start: 2020-06-16 | End: 2020-06-16

## 2020-06-16 RX ORDER — PSEUDOEPHEDRINE HCL 30 MG
100 TABLET ORAL 2 TIMES DAILY
Qty: 14 CAPSULE | Refills: 0 | Status: SHIPPED | OUTPATIENT
Start: 2020-06-16 | End: 2020-11-09

## 2020-06-16 RX ORDER — POLYETHYLENE GLYCOL 3350 17 G/17G
17 POWDER, FOR SOLUTION ORAL 2 TIMES DAILY PRN
Qty: 14 EACH | Refills: 0 | Status: SHIPPED | OUTPATIENT
Start: 2020-06-16 | End: 2020-11-09

## 2020-06-16 RX ORDER — DOCUSATE SODIUM 100 MG/1
100 CAPSULE, LIQUID FILLED ORAL 2 TIMES DAILY
Status: DISCONTINUED | OUTPATIENT
Start: 2020-06-16 | End: 2020-06-16

## 2020-06-16 NOTE — PHYSICAL THERAPY NOTE
PHYSICAL THERAPY EVALUATION - INPATIENT     Room Number: 420/420-A  Evaluation Date: 6/16/2020  Type of Evaluation: Initial   Physician Order: PT Eval and Treat    Presenting Problem: C3-4 posterior fusion  Reason for Therapy: Mobility Dysfunction and present, RN aware. The patient's Approx Degree of Impairment: 41.77% has been calculated based on documentation in the [facility] '6 clicks' Inpatient Basic Mobility Short Form.   Research supports that patients with this level of impairment may benefit fr ASSESSMENT  Ratin  Location: neck area  Management Techniques: Activity promotion;Relaxation;Repositioning; Body mechanics(received pain meds prior to session)    COGNITION  · Overall Cognitive Status:  WFL - within functional limits    RANGE OF MOTION reach;RN aware of session/findings;Bracing education provided; All patient questions and concerns addressed; Family present    Patient was able to achieve the following . ..    Patient able to transfer  Safely and independently    Patient able to ambulate on l

## 2020-06-16 NOTE — PLAN OF CARE
Problem: Patient Centered Care  Goal: Patient preferences are identified and integrated in the patient's plan of care  Description  Interventions:  - What would you like us to know as we care for you?   - Provide timely, complete, and accurate informatio appropriate  - Communicate ordered activity level and limitations with patient/family  Outcome: Progressing     Problem: PAIN - ADULT  Goal: Verbalizes/displays adequate comfort level or patient's stated pain goal  Description  INTERVENTIONS:  - Encourage

## 2020-06-16 NOTE — PROGRESS NOTES
[facility]    Neurosurgery Progress Note    Yulietjyothi Garayjose miguel Patient Status:  Observation    1955 MRN [id number]   Location [facility] 4W/SW/SE Attending Yelitza Mcduffie MD   Hosp Day # 0 PCP Kam Beach MD     Subjective:  A tab 15 mg, 15 mg, Oral, Q4H PRN  •  HYDROmorphone HCl (DILAUDID) 1 MG/ML injection 0.2 mg, 0.2 mg, Intravenous, Q2H PRN **OR** HYDROmorphone HCl (DILAUDID) 1 MG/ML injection 0.4 mg, 0.4 mg, Intravenous, Q2H PRN **OR** HYDROmorphone HCl (DILAUDID) 1 MG/ML i

## 2020-06-16 NOTE — PLAN OF CARE
Pain controlled with PRN percocet. Voiding. To remove drain this morning. Aspen collar on at all times. Tolerating PO intake. Up standby assist. Plan for d/c to home today pending medical/PT clearance.    Problem: Patient Centered Care  Goal: Patient prefer level or patient's stated pain goal  Description  INTERVENTIONS:  - Encourage pt to monitor pain and request assistance  - Assess pain using appropriate pain scale  - Administer analgesics based on type and severity of pain and evaluate response  - Impleme

## 2020-06-16 NOTE — DISCHARGE SUMMARY
[facility]    Discharge Summary    Rhae Specking Patient Status:  Outpatient in a Bed    1955 MRN [id number]   Location [facility] 4W/SW/SE Attending Miguelito Ohara MD   Hosp Day # 0 PCP Hugo Zamudio MD     Date of Admission: Lucien Ascencio MD [facility] OR [facility]            Discharge Plan:   Discharge Condition: Stable    Current Discharge Medication List    Home Meds - Unchanged    ALENDRONATE SODIUM OR  Take 80 mg by mouth once a week.       Losartan Potassium-HCTZ 100-25 MG normal:  · For you to have a sore throat if you had a breathing tube during surgery (while you were asleep!). The sore throat should get better within 48 hours.  You can gargle with warm salt water (1/2 tsp in 4 oz warm water) or use a throat lozenge for co

## 2020-06-16 NOTE — OCCUPATIONAL THERAPY NOTE
OCCUPATIONAL THERAPY EVALUATION - INPATIENT      Room Number: 420/420-A  Evaluation Date: 6/16/2020  Type of Evaluation: Initial  Presenting Problem: (C34 fusion)    Physician Order: IP Consult to Occupational Therapy  Reason for Therapy: ADL/IADL Dysfunct Approx Degree of Impairment: 42.8% has been calculated based on documentation in the [facility] '6 clicks' Inpatient Daily Activity Short Form.   Research supports that patients with this level of impairment may benefit from Sabi 78 however supportive spouse plans t clothing?: A Little  -   Bathing (including washing, rinsing, drying)?: A Little  -   Toileting, which includes using toilet, bedpan or urinal? : A Little  -   Putting on and taking off regular upper body clothing?: A Little  -   Taking care of personal gr

## 2020-07-02 ENCOUNTER — APPOINTMENT (OUTPATIENT)
Dept: CT IMAGING | Facility: HOSPITAL | Age: 65
DRG: 872 | End: 2020-07-02
Attending: EMERGENCY MEDICINE
Payer: COMMERCIAL

## 2020-07-02 ENCOUNTER — HOSPITAL ENCOUNTER (INPATIENT)
Facility: HOSPITAL | Age: 65
LOS: 2 days | Discharge: HOME | DRG: 872 | End: 2020-07-04
Attending: EMERGENCY MEDICINE | Admitting: HOSPITALIST
Payer: COMMERCIAL

## 2020-07-02 ENCOUNTER — APPOINTMENT (OUTPATIENT)
Dept: GENERAL RADIOLOGY | Facility: HOSPITAL | Age: 65
DRG: 872 | End: 2020-07-02
Attending: EMERGENCY MEDICINE
Payer: COMMERCIAL

## 2020-07-02 ENCOUNTER — HOSPITAL ENCOUNTER (OUTPATIENT)
Age: 65
Discharge: HOME | End: 2020-07-02
Attending: EMERGENCY MEDICINE
Payer: COMMERCIAL

## 2020-07-02 VITALS
RESPIRATION RATE: 20 BRPM | TEMPERATURE: 98 F | DIASTOLIC BLOOD PRESSURE: 58 MMHG | SYSTOLIC BLOOD PRESSURE: 147 MMHG | OXYGEN SATURATION: 95 % | HEART RATE: 113 BPM

## 2020-07-02 DIAGNOSIS — R52 GENERALIZED BODY ACHES: ICD-10-CM

## 2020-07-02 DIAGNOSIS — R51.9 HEADACHE DISORDER: Primary | ICD-10-CM

## 2020-07-02 DIAGNOSIS — Z98.1 S/P CERVICAL SPINAL FUSION: ICD-10-CM

## 2020-07-02 DIAGNOSIS — R50.9 FEVER, UNSPECIFIED FEVER CAUSE: Primary | ICD-10-CM

## 2020-07-02 DIAGNOSIS — R10.9 ABDOMINAL PAIN OF UNKNOWN ETIOLOGY: ICD-10-CM

## 2020-07-02 PROBLEM — E87.6 HYPOKALEMIA: Status: ACTIVE | Noted: 2020-07-02

## 2020-07-02 PROBLEM — E87.1 HYPONATREMIA: Status: ACTIVE | Noted: 2020-07-02

## 2020-07-02 PROBLEM — R73.9 HYPERGLYCEMIA: Status: ACTIVE | Noted: 2020-07-02

## 2020-07-02 PROBLEM — I10 BENIGN ESSENTIAL HTN: Chronic | Status: ACTIVE | Noted: 2020-06-15

## 2020-07-02 LAB
ALBUMIN SERPL-MCNC: 4 G/DL (ref 3.4–5)
ALBUMIN/GLOB SERPL: 1 {RATIO} (ref 1–2)
ALP LIVER SERPL-CCNC: 94 U/L (ref 55–142)
ALT SERPL-CCNC: 63 U/L (ref 13–56)
ANION GAP SERPL CALC-SCNC: 10 MMOL/L (ref 0–18)
APTT PPP: 38.5 SECONDS (ref 25.4–36.1)
AST SERPL-CCNC: 31 U/L (ref 15–37)
BASOPHILS # BLD AUTO: 0.02 X10(3) UL (ref 0–0.2)
BASOPHILS NFR BLD AUTO: 0.3 %
BILIRUB SERPL-MCNC: 0.7 MG/DL (ref 0.1–2)
BILIRUB UR QL STRIP.AUTO: NEGATIVE
BUN BLD-MCNC: 8 MG/DL (ref 7–18)
BUN/CREAT SERPL: 9.5 (ref 10–20)
CALCIUM BLD-MCNC: 8.8 MG/DL (ref 8.5–10.1)
CHLORIDE SERPL-SCNC: 95 MMOL/L (ref 98–112)
CLARITY UR REFRACT.AUTO: CLEAR
CO2 SERPL-SCNC: 23 MMOL/L (ref 21–32)
CREAT BLD-MCNC: 0.84 MG/DL (ref 0.55–1.02)
DEPRECATED RDW RBC AUTO: 39.1 FL (ref 35.1–46.3)
EOSINOPHIL # BLD AUTO: 0.02 X10(3) UL (ref 0–0.7)
EOSINOPHIL NFR BLD AUTO: 0.3 %
ERYTHROCYTE [DISTWIDTH] IN BLOOD BY AUTOMATED COUNT: 11.9 % (ref 11–15)
GLOBULIN PLAS-MCNC: 4 G/DL (ref 2.8–4.4)
GLUCOSE BLD-MCNC: 131 MG/DL (ref 70–99)
GLUCOSE UR STRIP.AUTO-MCNC: NEGATIVE MG/DL
HCT VFR BLD AUTO: 35.4 % (ref 35–48)
HGB BLD-MCNC: 12.4 G/DL (ref 12–16)
IMM GRANULOCYTES # BLD AUTO: 0.02 X10(3) UL (ref 0–1)
IMM GRANULOCYTES NFR BLD: 0.3 %
INR BLD: 1.04 (ref 0.89–1.11)
KETONES UR STRIP.AUTO-MCNC: NEGATIVE MG/DL
LACTATE SERPL-SCNC: 1.8 MMOL/L (ref 0.4–2)
LACTATE SERPL-SCNC: 1.9 MMOL/L (ref 0.4–2)
LACTATE SERPL-SCNC: 2.2 MMOL/L (ref 0.4–2)
LEUKOCYTE ESTERASE UR QL STRIP.AUTO: NEGATIVE
LYMPHOCYTES # BLD AUTO: 0.44 X10(3) UL (ref 1–4)
LYMPHOCYTES NFR BLD AUTO: 7.3 %
M PROTEIN MFR SERPL ELPH: 8 G/DL (ref 6.4–8.2)
MCH RBC QN AUTO: 31.5 PG (ref 26–34)
MCHC RBC AUTO-ENTMCNC: 35 G/DL (ref 31–37)
MCV RBC AUTO: 89.8 FL (ref 80–100)
MONOCYTES # BLD AUTO: 0.31 X10(3) UL (ref 0.1–1)
MONOCYTES NFR BLD AUTO: 5.1 %
NEUTROPHILS # BLD AUTO: 5.24 X10 (3) UL (ref 1.5–7.7)
NEUTROPHILS # BLD AUTO: 5.24 X10(3) UL (ref 1.5–7.7)
NEUTROPHILS NFR BLD AUTO: 86.7 %
NITRITE UR QL STRIP.AUTO: NEGATIVE
OSMOLALITY SERPL CALC.SUM OF ELEC: 266 MOSM/KG (ref 275–295)
PH UR STRIP.AUTO: 7 [PH] (ref 4.5–8)
PLATELET # BLD AUTO: 245 10(3)UL (ref 150–450)
POTASSIUM SERPL-SCNC: 3.1 MMOL/L (ref 3.5–5.1)
PROCALCITONIN SERPL-MCNC: 0.05 NG/ML (ref ?–0.16)
PROT UR STRIP.AUTO-MCNC: NEGATIVE MG/DL
PSA SERPL DL<=0.01 NG/ML-MCNC: 13.9 SECONDS (ref 12.4–14.6)
RBC # BLD AUTO: 3.94 X10(6)UL (ref 3.8–5.3)
RBC #/AREA URNS AUTO: >10 /HPF
SARS-COV-2 RNA RESP QL NAA+PROBE: NOT DETECTED
SODIUM SERPL-SCNC: 128 MMOL/L (ref 136–145)
SP GR UR STRIP.AUTO: 1.01 (ref 1–1.03)
UROBILINOGEN UR STRIP.AUTO-MCNC: <2 MG/DL
WBC # BLD AUTO: 6.1 X10(3) UL (ref 4–11)

## 2020-07-02 PROCEDURE — 99213 OFFICE O/P EST LOW 20 MIN: CPT

## 2020-07-02 PROCEDURE — 71045 X-RAY EXAM CHEST 1 VIEW: CPT | Performed by: EMERGENCY MEDICINE

## 2020-07-02 PROCEDURE — 99223 1ST HOSP IP/OBS HIGH 75: CPT | Performed by: INTERNAL MEDICINE

## 2020-07-02 PROCEDURE — 74177 CT ABD & PELVIS W/CONTRAST: CPT | Performed by: EMERGENCY MEDICINE

## 2020-07-02 RX ORDER — SODIUM CHLORIDE 9 MG/ML
INJECTION, SOLUTION INTRAVENOUS CONTINUOUS
Status: DISCONTINUED | OUTPATIENT
Start: 2020-07-02 | End: 2020-07-03

## 2020-07-02 RX ORDER — MAGNESIUM OXIDE 400 MG (241.3 MG MAGNESIUM) TABLET
3 TABLET NIGHTLY
Status: DISCONTINUED | OUTPATIENT
Start: 2020-07-02 | End: 2020-07-04

## 2020-07-02 RX ORDER — ACETAMINOPHEN 500 MG
1000 TABLET ORAL ONCE
Status: COMPLETED | OUTPATIENT
Start: 2020-07-02 | End: 2020-07-02

## 2020-07-02 RX ORDER — POTASSIUM CHLORIDE 20 MEQ/1
40 TABLET, EXTENDED RELEASE ORAL ONCE
Status: COMPLETED | OUTPATIENT
Start: 2020-07-02 | End: 2020-07-02

## 2020-07-02 RX ORDER — ACETAMINOPHEN 325 MG/1
650 TABLET ORAL EVERY 6 HOURS PRN
Status: DISCONTINUED | OUTPATIENT
Start: 2020-07-02 | End: 2020-07-04

## 2020-07-02 RX ORDER — IBUPROFEN 600 MG/1
600 TABLET ORAL ONCE
Status: COMPLETED | OUTPATIENT
Start: 2020-07-02 | End: 2020-07-02

## 2020-07-02 RX ORDER — METOCLOPRAMIDE HYDROCHLORIDE 5 MG/ML
10 INJECTION INTRAMUSCULAR; INTRAVENOUS EVERY 8 HOURS PRN
Status: DISCONTINUED | OUTPATIENT
Start: 2020-07-02 | End: 2020-07-04

## 2020-07-02 RX ORDER — ATORVASTATIN CALCIUM 20 MG/1
20 TABLET, FILM COATED ORAL NIGHTLY
Status: DISCONTINUED | OUTPATIENT
Start: 2020-07-02 | End: 2020-07-04

## 2020-07-02 RX ORDER — ONDANSETRON 4 MG/1
4 TABLET, ORALLY DISINTEGRATING ORAL ONCE
Status: COMPLETED | OUTPATIENT
Start: 2020-07-02 | End: 2020-07-02

## 2020-07-02 RX ORDER — SODIUM CHLORIDE 9 MG/ML
INJECTION, SOLUTION INTRAVENOUS CONTINUOUS
Status: ACTIVE | OUTPATIENT
Start: 2020-07-02 | End: 2020-07-02

## 2020-07-02 RX ORDER — ONDANSETRON 2 MG/ML
4 INJECTION INTRAMUSCULAR; INTRAVENOUS EVERY 6 HOURS PRN
Status: DISCONTINUED | OUTPATIENT
Start: 2020-07-02 | End: 2020-07-04

## 2020-07-02 RX ORDER — ONDANSETRON 2 MG/ML
4 INJECTION INTRAMUSCULAR; INTRAVENOUS EVERY 4 HOURS PRN
Status: DISCONTINUED | OUTPATIENT
Start: 2020-07-02 | End: 2020-07-04

## 2020-07-02 NOTE — ED INITIAL ASSESSMENT (HPI)
Patient presents with cc of bodyaches,chills,headache and nausea since last night.s/p posterior cervical fusion on June 15th 2020. Had staples removed yesterday. Hard collar in place.

## 2020-07-02 NOTE — ED PROVIDER NOTES
Patient Seen in: [facility] Emergency Department      History   Patient presents with:  Postop/Procedure Problem    Stated Complaint: chills,headache and nausea s/p cervical fusion(posterior)    HPI    This is a 79-year-old female who had a spinal f distress. The patient is in no respiratory distress. The patient is not septic or toxic    HEENT: Atraumatic, conjunctiva are not pale. There is no icterus. Oral mucosa Is wet. No facial trauma. The neck is supple.   She is got a c-collar in place  ELISEO -----------         ------                     CBC W/ DIFFERENTIAL[[id number]]          Abnormal            Final result                 Please view results for these tests on the individual orders.    LACTIC ACID 3 HR POST POSITIVE   RAINBOW The efrain and mediastinum appear unremarkable. There is minimal atelectasis seen at the left lung base. No consolidation or effusion. Degenerative changes are seen in the spine. CONCLUSION:  Minimal left basilar atelectasis.   No consolidation or pleura to definitively characterize. No other focal hepatic lesion is seen. BILIARY:  Status post cholecystectomy. PANCREAS:  No lesion, fluid collection, ductal dilatation, or atrophy.   SPLEEN:  Punctate calcification is noted consistent with old granulomatous less likely. 3. Incidental hypoattenuating liver and left renal lesions are too small to definitively characterize. Statistically these are likely benign.     Dictated by: Genaro Jordan MD on 7/02/2020 at 7:05 PM     Finalized by: Genaro Jordan, [facility]

## 2020-07-02 NOTE — ED PROVIDER NOTES
Patient Seen in: THE [facility] In [facility] END      History   Patient presents with: Body ache and/or chills  Nausea    Stated Complaint: TL - chills, body aches, R/O covid.   SARTHAK Perea     HPI    Alem De Souza is a 79-year-old female who comes in tod Exam      General Appearance:  Alert and orientated x 4, cooperative, no distress, appropriate for age  Head:  Normocephalic, atraumatic, without obvious abnormality  Eyes:  PERRL, EOM's intact, conjunctiva and cornea clear, normal fundoscopic exam   Ana patient instructions regarding her diagnosis, expectations, follow up, and return to the ER precautions. I explained to the patient that emergent conditions may arise to return to the immediate care or ER for new, worsening or any persistent conditions.

## 2020-07-02 NOTE — ED PROVIDER NOTES
I reviewed that chart and discussed the case. I have examined the patient and noted oropharyngeal exam was normal chest was nontender abdomen was nontender.       I agree with the following clinical impression(s):  Headache disorder  (primary encounter kerri

## 2020-07-02 NOTE — ED INITIAL ASSESSMENT (HPI)
Pt is ambulatory to room, states that she woke up today with body aches and chills with nausea, denies fever or vomiting. S/p cervical fusion posterior neck surgery. Pt also reports burning urination with frequency and urgency x 3 days.  Denies abdominal or

## 2020-07-03 LAB
ANION GAP SERPL CALC-SCNC: 5 MMOL/L (ref 0–18)
BASOPHILS # BLD AUTO: 0.01 X10(3) UL (ref 0–0.2)
BASOPHILS NFR BLD AUTO: 0.3 %
BUN BLD-MCNC: 5 MG/DL (ref 7–18)
BUN/CREAT SERPL: 7.6 (ref 10–20)
CALCIUM BLD-MCNC: 8.3 MG/DL (ref 8.5–10.1)
CHLORIDE SERPL-SCNC: 105 MMOL/L (ref 98–112)
CO2 SERPL-SCNC: 25 MMOL/L (ref 21–32)
CREAT BLD-MCNC: 0.66 MG/DL (ref 0.55–1.02)
DEPRECATED RDW RBC AUTO: 42.9 FL (ref 35.1–46.3)
EOSINOPHIL # BLD AUTO: 0.02 X10(3) UL (ref 0–0.7)
EOSINOPHIL NFR BLD AUTO: 0.6 %
ERYTHROCYTE [DISTWIDTH] IN BLOOD BY AUTOMATED COUNT: 12.5 % (ref 11–15)
GLUCOSE BLD-MCNC: 117 MG/DL (ref 70–99)
HCT VFR BLD AUTO: 32.9 % (ref 35–48)
HGB BLD-MCNC: 11.3 G/DL (ref 12–16)
IMM GRANULOCYTES # BLD AUTO: 0.01 X10(3) UL (ref 0–1)
IMM GRANULOCYTES NFR BLD: 0.3 %
LYMPHOCYTES # BLD AUTO: 0.39 X10(3) UL (ref 1–4)
LYMPHOCYTES NFR BLD AUTO: 11.3 %
MCH RBC QN AUTO: 31.8 PG (ref 26–34)
MCHC RBC AUTO-ENTMCNC: 34.3 G/DL (ref 31–37)
MCV RBC AUTO: 92.7 FL (ref 80–100)
MONOCYTES # BLD AUTO: 0.31 X10(3) UL (ref 0.1–1)
MONOCYTES NFR BLD AUTO: 9 %
NEUTROPHILS # BLD AUTO: 2.72 X10 (3) UL (ref 1.5–7.7)
NEUTROPHILS # BLD AUTO: 2.72 X10(3) UL (ref 1.5–7.7)
NEUTROPHILS NFR BLD AUTO: 78.5 %
OSMOLALITY SERPL CALC.SUM OF ELEC: 278 MOSM/KG (ref 275–295)
PLATELET # BLD AUTO: 209 10(3)UL (ref 150–450)
POTASSIUM SERPL-SCNC: 3.6 MMOL/L (ref 3.5–5.1)
POTASSIUM SERPL-SCNC: 3.8 MMOL/L (ref 3.5–5.1)
RBC # BLD AUTO: 3.55 X10(6)UL (ref 3.8–5.3)
SODIUM SERPL-SCNC: 135 MMOL/L (ref 136–145)
WBC # BLD AUTO: 3.5 X10(3) UL (ref 4–11)

## 2020-07-03 PROCEDURE — 99232 SBSQ HOSP IP/OBS MODERATE 35: CPT | Performed by: HOSPITALIST

## 2020-07-03 RX ORDER — POTASSIUM CHLORIDE 20 MEQ/1
40 TABLET, EXTENDED RELEASE ORAL ONCE
Status: DISCONTINUED | OUTPATIENT
Start: 2020-07-03 | End: 2020-07-04

## 2020-07-03 RX ORDER — LOSARTAN POTASSIUM 50 MG/1
50 TABLET ORAL DAILY
Status: DISCONTINUED | OUTPATIENT
Start: 2020-07-04 | End: 2020-07-04

## 2020-07-03 RX ORDER — POLYETHYLENE GLYCOL 3350 17 G/17G
17 POWDER, FOR SOLUTION ORAL DAILY PRN
Status: DISCONTINUED | OUTPATIENT
Start: 2020-07-03 | End: 2020-07-04

## 2020-07-03 RX ORDER — POTASSIUM CHLORIDE 20 MEQ/1
40 TABLET, EXTENDED RELEASE ORAL EVERY 4 HOURS
Status: COMPLETED | OUTPATIENT
Start: 2020-07-03 | End: 2020-07-03

## 2020-07-03 RX ORDER — POLYETHYLENE GLYCOL 3350 17 G/17G
17 POWDER, FOR SOLUTION ORAL DAILY
Status: DISCONTINUED | OUTPATIENT
Start: 2020-07-03 | End: 2020-07-03

## 2020-07-03 NOTE — DIETARY NOTE
[address]     Admitting diagnosis:  Abdominal pain of unknown etiology [R10.9]  Fever, unspecified fever cause [R50.9]    Ht: 157.5 cm (5' 2\")  Wt: 68 kg (150 lb). BMI: Body mass index is 27.44 kg/m².   JORGE: Isabella Oliveira

## 2020-07-03 NOTE — PLAN OF CARE
NURSING ADMISSION NOTE      Patient admitted via Cart  Oriented to room. Safety precautions initiated. Bed in low position. Call light in reach. Patient is A/Ox4. RA. SCDs.  Voids, pt states she had dysuria before but is better with IV fluids runn

## 2020-07-03 NOTE — PROGRESS NOTES
[facility]ITAL[facility]  Progress Note     Joss Rogers Patient Status:  Inpatient    1955 MRN [id number]   [facility] 3NW-A Attending Lindsay Cole MD   Hosp Day # 1 PCP Jabari Richmond MD     Chief Complaint: UTI    S: Patient feels ok.  Ruperto Kawasaki results for input(s): TROP, PBNP in the last 168 hours. Creatinine Kinase  No results for input(s): CK in the last 168 hours. Inflammatory Markers  No results for input(s): CRP, CONSTANTINO, LDH, DDIMER in the last 168 hours.     Imaging: Imaging data reviewe

## 2020-07-03 NOTE — PROGRESS NOTES
07/03/20 1438   Clinical Encounter Type   Visited With Patient and family together  ( was present by the bedside)   Routine Visit Introduction   Patient Spiritual Encounters   Spiritual Assessment Completed Yes    introduced self.   Assess

## 2020-07-03 NOTE — H&P
[facility]  History and Physical     Philippe Steward Patient Status:  Emergency    1955 MRN [id number]   Location [address] [facility] Attending Ninoska Wilkes MD   Hosp Day # 0 PCP Sulma Davis MD     Chief Complaint: f Disp: 14 tablet, Rfl: 0  ALENDRONATE SODIUM OR, Take 80 mg by mouth once a week.  , Disp: , Rfl:   Losartan Potassium-HCTZ 100-25 MG Oral Tab, Take 1 tablet by mouth daily. , Disp: , Rfl:   atorvastatin 20 MG Oral Tab, Take 20 mg by mouth nightly., Disp: , findings  2. Empiric IV abx  3. Follow cultures  4. IVF  5. Follow LA  6. CT A/P pending  2. Hyponatremia  1. Continue hydration and monitor BMP  3. S/P cervical fusion  1. In aspen collar  4. Ess HTN  1. Continue home meds  5. Dyslipidemia  1.  Continue st

## 2020-07-04 VITALS
OXYGEN SATURATION: 97 % | HEIGHT: 62 IN | HEART RATE: 77 BPM | BODY MASS INDEX: 27.6 KG/M2 | TEMPERATURE: 98 F | RESPIRATION RATE: 16 BRPM | WEIGHT: 150 LBS | SYSTOLIC BLOOD PRESSURE: 115 MMHG | DIASTOLIC BLOOD PRESSURE: 47 MMHG

## 2020-07-04 LAB — POTASSIUM SERPL-SCNC: 3.7 MMOL/L (ref 3.5–5.1)

## 2020-07-04 PROCEDURE — 99239 HOSP IP/OBS DSCHRG MGMT >30: CPT | Performed by: HOSPITALIST

## 2020-07-04 RX ORDER — LOSARTAN POTASSIUM 50 MG/1
50 TABLET ORAL DAILY
Qty: 30 TABLET | Refills: 0 | Status: SHIPPED | OUTPATIENT
Start: 2020-07-05 | End: 2020-11-09

## 2020-07-04 RX ORDER — POTASSIUM CHLORIDE 20 MEQ/1
40 TABLET, EXTENDED RELEASE ORAL ONCE
Status: COMPLETED | OUTPATIENT
Start: 2020-07-04 | End: 2020-07-04

## 2020-07-04 RX ORDER — LEVOFLOXACIN 750 MG/1
750 TABLET ORAL DAILY
Qty: 5 TABLET | Refills: 0 | Status: SHIPPED | OUTPATIENT
Start: 2020-07-04 | End: 2020-07-09

## 2020-07-04 NOTE — PLAN OF CARE
Pt is A&O, VSS, and denies pain. Pt is on RA with bilateral clear lung sounds. Neck brace in place from spinal fusion. Bandage to incision site C/D/I. Abdomen is soft and nontender. Pt is voiding freely, dark yellow. Tolerating regular diet. AD KECIA.  Wears

## 2020-07-04 NOTE — PROGRESS NOTES
Patient seen and examined. Medically clear to discharge today. Feels much better today. Eating well w/o any complaints. Ok to DC on course of ABX.     Nereida Silvestre MD

## 2020-07-05 NOTE — DISCHARGE SUMMARY
[facility] HOSPITALIST  DISCHARGE SUMMARY     Dot Kiser Patient Status:  Inpatient    1955 MRN [id number]   [facility] 3NW-A Attending No att. providers found   Hosp Day # 2 PCP Bill Barrett MD     Date of Admission: 2020  Date of D known as:  LEVAQUIN      Take 1 tablet (750 mg total) by mouth daily for 5 days.    Stop taking on:  July 9, 2020  Quantity:  5 tablet  Refills:  0     losartan Potassium 50 MG Tabs  Commonly known as:  COZAAR      Take 1 tablet (50 mg total) by mouth daily sounds.     -----------------------------------------------------------------------------------------------  PATIENT DISCHARGE INSTRUCTIONS: See electronic chart    Meenakshi Schaefer MD    Time spent:  > 30 minutes

## 2020-07-06 NOTE — PAYOR COMM NOTE
--------------  ADMISSION REVIEW     Payor: SHARRI SERVIN  Subscriber #:  [id number]  Authorization Number: [id number]    Admit date: 7/2/20  Admit time: 2132       Patient Seen in: [facility] Emergency Department    History   Patient presents with:  Pos URINALYSIS WITH CULTURE REFLEX - Abnormal; Notable for the following components:       Result Value    Blood Urine Small (*)     RBC URINE >10 (*)     Mucous Urine 1+ (*)     Non-Squamous Epithelial Small (*)     All other components within normal limits 6/15/2020    Performed by Rondall Boas, MD at [address] [place] [address] MAIN OR     Physical Exam:    /65   Pulse 99   Temp 99.1 °F (37.3 °C) (Temporal)   Resp 24   Ht 157.5 cm (5' 2\")   Wt 150 lb (68 kg)   SpO2 96%   BMI 27.44 kg/m²      HEENT: Normocephalic atra bilaterally. No wheezes. No rhonchi. Cardiovascular: S1, S2. Regular rate and rhythm. No murmurs  Abdomen: Soft, nontender, nondistended. Positive bowel sounds. No rebound or guarding. Neurologic: No focal neurological deficits.    Musculoskeletal: Moves

## 2020-07-13 ENCOUNTER — HOSPITAL ENCOUNTER (OUTPATIENT)
Dept: GENERAL RADIOLOGY | Age: 65
Discharge: HOME | End: 2020-07-13
Attending: NEUROLOGICAL SURGERY
Payer: COMMERCIAL

## 2020-07-13 DIAGNOSIS — M47.892 OTHER SPONDYLOSIS, CERVICAL REGION: ICD-10-CM

## 2020-07-13 PROCEDURE — 72040 X-RAY EXAM NECK SPINE 2-3 VW: CPT | Performed by: NEUROLOGICAL SURGERY

## 2020-11-02 ENCOUNTER — OFFICE VISIT (OUTPATIENT)
Dept: FAMILY MEDICINE CLINIC | Facility: CLINIC | Age: 65
End: 2020-11-02
Payer: COMMERCIAL

## 2020-11-02 VITALS
DIASTOLIC BLOOD PRESSURE: 80 MMHG | OXYGEN SATURATION: 99 % | HEIGHT: 62 IN | SYSTOLIC BLOOD PRESSURE: 140 MMHG | BODY MASS INDEX: 27.6 KG/M2 | TEMPERATURE: 98 F | WEIGHT: 150 LBS | HEART RATE: 90 BPM

## 2020-11-02 DIAGNOSIS — Z20.822 SUSPECTED 2019 NOVEL CORONAVIRUS INFECTION: ICD-10-CM

## 2020-11-02 DIAGNOSIS — J02.9 SORE THROAT: Primary | ICD-10-CM

## 2020-11-02 PROCEDURE — 87880 STREP A ASSAY W/OPTIC: CPT | Performed by: NURSE PRACTITIONER

## 2020-11-02 PROCEDURE — 3008F BODY MASS INDEX DOCD: CPT | Performed by: NURSE PRACTITIONER

## 2020-11-02 PROCEDURE — 99213 OFFICE O/P EST LOW 20 MIN: CPT | Performed by: NURSE PRACTITIONER

## 2020-11-02 PROCEDURE — 3079F DIAST BP 80-89 MM HG: CPT | Performed by: NURSE PRACTITIONER

## 2020-11-02 PROCEDURE — U0003 INFECTIOUS AGENT DETECTION BY NUCLEIC ACID (DNA OR RNA); SEVERE ACUTE RESPIRATORY SYNDROME CORONAVIRUS 2 (SARS-COV-2) (CORONAVIRUS DISEASE [COVID-19]), AMPLIFIED PROBE TECHNIQUE, MAKING USE OF HIGH THROUGHPUT TECHNOLOGIES AS DESCRIBED BY CMS-2020-01-R: HCPCS | Performed by: NURSE PRACTITIONER

## 2020-11-02 PROCEDURE — 3077F SYST BP >= 140 MM HG: CPT | Performed by: NURSE PRACTITIONER

## 2020-11-02 PROCEDURE — 87081 CULTURE SCREEN ONLY: CPT | Performed by: NURSE PRACTITIONER

## 2020-11-02 NOTE — PROGRESS NOTES
CHIEF COMPLAINT:   Patient presents with:  Cough: sore throat, cough x 2 days. HPI:   Joss Rogers is a 76year old female who presents for upper respiratory symptoms for  2 days. Patient reports cough, sore throat.  Symptoms have been peristent since developed, well nourished,in no apparent distress  SKIN: no rashes,no suspicious lesions  HEAD: atraumatic, normocephalic.  no tenderness on palpation of sinuses  EYES: conjunctiva clear, EOM intact  EARS: TM's grey, no bulging, noretraction,no fluid, bony COVID-19: Kenji jett [phone] (24/7)   Correo electrónico JACQUELYN [email or web address]     No hay tratamiento antiviral específico recomendado para el COVID-19. Las personas con COVID-19 deberían recibir cuidado de apoyo para ayudar a [employer]. de limpieza para el hogar de acuerdo con las instrucciones en la etiqueta.       Los pacientes con COVID-19 confirmado deberían permanecer de acuerdo con precauciones de aislamiento en el hogar [employer] el riesgo de transmisión secundaria a otro se conside COVID-19 y no ha dado positivo en la prueba de COVID-19, también debe quedarse en casa y [place] de los demás michael un total de 10 días después de la aparición de los síntomas, O hasta 67 horas después de que la fiebre haya desaparecido y los síntomas es

## 2020-11-02 NOTE — PATIENT INSTRUCTIONS
Enfermedad del Coronavirus 2019 (COVID-19)     [facility] tiene un compromiso con la seguridad y Earnstine Hodge de nuestros pacientes, miembros, empleados y comunidades.  A medida que surgen preocupaciones sobre la nueva cepa del coronavirus que caus contenga al menos un 60% de alcohol. 8. Tanto eduardo sea posible, permanezca en un cuarto específico y alejado de otras personas en collins hogar. Igualmente, usted debería usar un baño separado, si está disponible.  Si usted necesita estar alrededor de otras pe permanecer aislado en collins hogar si cumple con los siguientes requisitos:  • Hua pasado al menos 3 días (72 horas) desde collins recuperación (se entiende por recuperación la desaparición de la fiebre sin el uso de medicamentos antifebriles); y  • Mumtaz gonsales

## 2020-11-09 ENCOUNTER — HOSPITAL ENCOUNTER (INPATIENT)
Facility: HOSPITAL | Age: 65
LOS: 8 days | Discharge: HOME | DRG: 177 | End: 2020-11-17
Attending: EMERGENCY MEDICINE | Admitting: HOSPITALIST
Payer: COMMERCIAL

## 2020-11-09 ENCOUNTER — APPOINTMENT (OUTPATIENT)
Dept: GENERAL RADIOLOGY | Facility: HOSPITAL | Age: 65
DRG: 177 | End: 2020-11-09
Attending: EMERGENCY MEDICINE
Payer: COMMERCIAL

## 2020-11-09 DIAGNOSIS — U07.1 PNEUMONIA DUE TO COVID-19 VIRUS: Primary | ICD-10-CM

## 2020-11-09 DIAGNOSIS — E87.1 HYPONATREMIA: ICD-10-CM

## 2020-11-09 DIAGNOSIS — E87.6 HYPOKALEMIA: ICD-10-CM

## 2020-11-09 DIAGNOSIS — J12.82 PNEUMONIA DUE TO COVID-19 VIRUS: Primary | ICD-10-CM

## 2020-11-09 DIAGNOSIS — R09.02 HYPOXIA: ICD-10-CM

## 2020-11-09 PROCEDURE — 71045 X-RAY EXAM CHEST 1 VIEW: CPT | Performed by: EMERGENCY MEDICINE

## 2020-11-09 PROCEDURE — 3E033GC INTRODUCTION OF OTHER THERAPEUTIC SUBSTANCE INTO PERIPHERAL VEIN, PERCUTANEOUS APPROACH: ICD-10-PCS | Performed by: HOSPITALIST

## 2020-11-09 PROCEDURE — 99223 1ST HOSP IP/OBS HIGH 75: CPT | Performed by: HOSPITALIST

## 2020-11-09 PROCEDURE — XW033E5 INTRODUCTION OF REMDESIVIR ANTI-INFECTIVE INTO PERIPHERAL VEIN, PERCUTANEOUS APPROACH, NEW TECHNOLOGY GROUP 5: ICD-10-PCS | Performed by: HOSPITALIST

## 2020-11-09 RX ORDER — POLYETHYLENE GLYCOL 3350 17 G/17G
17 POWDER, FOR SOLUTION ORAL DAILY PRN
Status: DISCONTINUED | OUTPATIENT
Start: 2020-11-09 | End: 2020-11-17

## 2020-11-09 RX ORDER — METOCLOPRAMIDE HYDROCHLORIDE 5 MG/ML
10 INJECTION INTRAMUSCULAR; INTRAVENOUS EVERY 8 HOURS PRN
Status: DISCONTINUED | OUTPATIENT
Start: 2020-11-09 | End: 2020-11-17

## 2020-11-09 RX ORDER — DOCUSATE SODIUM 100 MG/1
100 CAPSULE, LIQUID FILLED ORAL 2 TIMES DAILY
Status: DISCONTINUED | OUTPATIENT
Start: 2020-11-09 | End: 2020-11-17

## 2020-11-09 RX ORDER — ENOXAPARIN SODIUM 100 MG/ML
40 INJECTION SUBCUTANEOUS DAILY
Status: DISCONTINUED | OUTPATIENT
Start: 2020-11-09 | End: 2020-11-17

## 2020-11-09 RX ORDER — DEXAMETHASONE SODIUM PHOSPHATE 4 MG/ML
6 VIAL (ML) INJECTION ONCE
Status: COMPLETED | OUTPATIENT
Start: 2020-11-09 | End: 2020-11-09

## 2020-11-09 RX ORDER — BENZONATATE 100 MG/1
100 CAPSULE ORAL 3 TIMES DAILY PRN
Status: DISCONTINUED | OUTPATIENT
Start: 2020-11-09 | End: 2020-11-17

## 2020-11-09 RX ORDER — SODIUM CHLORIDE 9 MG/ML
125 INJECTION, SOLUTION INTRAVENOUS CONTINUOUS
Status: DISCONTINUED | OUTPATIENT
Start: 2020-11-09 | End: 2020-11-12

## 2020-11-09 RX ORDER — SODIUM CHLORIDE 9 MG/ML
INJECTION, SOLUTION INTRAVENOUS CONTINUOUS
Status: ACTIVE | OUTPATIENT
Start: 2020-11-09 | End: 2020-11-09

## 2020-11-09 RX ORDER — SODIUM PHOSPHATE, DIBASIC AND SODIUM PHOSPHATE, MONOBASIC 7; 19 G/133ML; G/133ML
1 ENEMA RECTAL ONCE AS NEEDED
Status: DISCONTINUED | OUTPATIENT
Start: 2020-11-09 | End: 2020-11-17

## 2020-11-09 RX ORDER — ALENDRONATE SODIUM 70 MG/1
70 TABLET ORAL WEEKLY
COMMUNITY
End: 2021-07-21

## 2020-11-09 RX ORDER — SODIUM CHLORIDE 9 MG/ML
INJECTION, SOLUTION INTRAVENOUS CONTINUOUS
Status: DISCONTINUED | OUTPATIENT
Start: 2020-11-09 | End: 2020-11-12

## 2020-11-09 RX ORDER — HYDROCHLOROTHIAZIDE 25 MG/1
25 TABLET ORAL EVERY MORNING
COMMUNITY
Start: 2020-10-12 | End: 2021-01-05

## 2020-11-09 RX ORDER — ONDANSETRON 2 MG/ML
4 INJECTION INTRAMUSCULAR; INTRAVENOUS EVERY 6 HOURS PRN
Status: DISCONTINUED | OUTPATIENT
Start: 2020-11-09 | End: 2020-11-17

## 2020-11-09 RX ORDER — DEXAMETHASONE SODIUM PHOSPHATE 4 MG/ML
6 VIAL (ML) INJECTION DAILY
Status: DISCONTINUED | OUTPATIENT
Start: 2020-11-09 | End: 2020-11-09

## 2020-11-09 RX ORDER — TELMISARTAN 80 MG/1
80 TABLET ORAL DAILY
COMMUNITY
Start: 2020-10-17 | End: 2021-02-15

## 2020-11-09 RX ORDER — ACETAMINOPHEN 325 MG/1
650 TABLET ORAL EVERY 6 HOURS PRN
Status: DISCONTINUED | OUTPATIENT
Start: 2020-11-09 | End: 2020-11-17

## 2020-11-09 RX ORDER — BISACODYL 10 MG
10 SUPPOSITORY, RECTAL RECTAL
Status: DISCONTINUED | OUTPATIENT
Start: 2020-11-09 | End: 2020-11-17

## 2020-11-09 RX ORDER — POTASSIUM CHLORIDE 20 MEQ/1
40 TABLET, EXTENDED RELEASE ORAL ONCE
Status: COMPLETED | OUTPATIENT
Start: 2020-11-09 | End: 2020-11-09

## 2020-11-09 NOTE — H&P
[facility] HOSPITALIST  History and Physical     Dot Kiser Patient Status:  Emergency    1955 MRN [id number]   Location [address] [facility] [address] Attending Diana Thomas, [address] [facility] Day # 0 PCP Bill Barrett MD     Chief Complaint: SOB    Hi OR, Take 1 tablet by mouth daily. , Disp: , Rfl:     •  atorvastatin 20 MG Oral Tab, Take 20 mg by mouth nightly., Disp: , Rfl:         Review of Systems:   A comprehensive 14 point review of systems was completed.     Pertinent positives and negatives noted statin therapy. 4.  Hyponatremia-we will continue gentle IV fluids. Recheck sodium level in the morning. 5.  Hypokalemia-we will replace per protocol.   Quality:  · DVT Prophylaxis: Lovenox  · CODE status: Full  · Anderson: N/A       Plan of care discussed

## 2020-11-09 NOTE — PROGRESS NOTES
NURSING ADMISSION NOTE      Patient admitted via Cart  Oriented to room. Safety precautions initiated. Bed in low position. Call light in reach. Pt was admitted to room from ER. Pt is aox4, VSS, afebrile. On 2L O2.  Pt is mainly Kazakh speaking

## 2020-11-09 NOTE — CONSULTS
INFECTIOUS DISEASE CONSULTATION    Ivette Hernandez Patient Status:  Inpatient    1955 MRN [id number]   [facility] 3NW-A Attending Leydi Mckeon MD   Hosp Day # 0 PCP Ori Huitron MD (COLACE) cap 100 mg, 100 mg, Oral, BID  •  PEG 3350 (MIRALAX) powder packet 17 g, 17 g, Oral, Daily PRN  •  magnesium hydroxide (MILK OF MAGNESIA) 400 MG/5ML suspension 30 mL, 30 mL, Oral, Daily PRN  •  bisacodyl (DULCOLAX) rectal suppository 10 mg, 10 mg, [18-34] 26  BP: (106-150)/(63-69) 144/65  HEENT: Moist mucous membranes. Extraocular muscles are intact. Neck: No lymphadenopathy. supple, no masses  Respiratory: non labored  Abdomen: Soft, nontender, nondistended  Musculoskeletal: Full range of motion of Hypoxia        ASSESSMENT/PLAN:  1.  COVID-19 pneumonia  Symptom onset 11/2 and tested same day  ---drove with friend to [place] unmasked, and friend had covid  02 sats at home low 90s on room air, was 94% on room air in ED but went to 85% with exertion and

## 2020-11-09 NOTE — ED PROVIDER NOTES
Patient Seen in: [facility] Emergency Department      History   Patient presents with:  Covid    Stated Complaint: covid    HPI    58-year-old female presents emergency room for evaluation of cough with difficulty breathing.   Patient tested positive distress. HEENT: no scleral icterus. Mucous membranes are moist, oropharynx is clear, uvula midline. Scalp is atraumatic. NECK: Neck is supple, there is no nuchal rigidity. No carotid bruits. No masses. Trachea midline.   No cervical lymphadenopath individual orders. PROCALCITONIN   RAINBOW DRAW BLUE   RAINBOW DRAW LAVENDER   RAINBOW DRAW LIGHT GREEN   RAINBOW DRAW GOLD   BLOOD CULTURE   BLOOD CULTURE                  MDM      Patient had IV established, placed on cardiac monitor and pulse ox.   Hal Sue

## 2020-11-09 NOTE — ED INITIAL ASSESSMENT (HPI)
Pt dx covid last week c/o chest pain, cough and fever Alert-The patient is alert, awake and responds to voice. The patient is oriented to time, place, and person. The triage nurse is able to obtain subjective information.

## 2020-11-10 PROCEDURE — 99232 SBSQ HOSP IP/OBS MODERATE 35: CPT | Performed by: HOSPITALIST

## 2020-11-10 RX ORDER — BUTALBITAL, ACETAMINOPHEN AND CAFFEINE 50; 325; 40 MG/1; MG/1; MG/1
1 TABLET ORAL EVERY 4 HOURS PRN
Status: DISCONTINUED | OUTPATIENT
Start: 2020-11-10 | End: 2020-11-17

## 2020-11-10 NOTE — PLAN OF CARE
Pt is aox4, VSS, low grade fever this AM. See MAR. Pt was turned up to 8L high flow briefly this AM but was since able ot get weaned to 4L NC and is tolerating well even with ambulation. Tele NSR. SCD's. Lovenox. Electrolyte. Up ad ralf.  Oral decadron, rey management  - Manage/alleviate anxiety  - Utilize distraction and/or relaxation techniques  - Monitor for opioid side effects  - Notify MD/LIP if interventions unsuccessful or patient reports new pain  - Anticipate increased pain with activity and pre-medi

## 2020-11-10 NOTE — PAYOR COMM NOTE
--------------  ADMISSION REVIEW     Payor: SHARRI [facility]  Subscriber #:  [id number]  Authorization Number: [id number]    Admit date: 11/9/20  Admit time: 1514       Admitting Physician: Sindy Greene MD  Attending Physician:  Shannan Davies Scalp is atraumatic. NECK: Neck is supple, there is no nuchal rigidity. No carotid bruits. No masses. Trachea midline. No cervical lymphadenopathy. HEART: Regular rate and rhythm, no murmurs. LUNGS: Coarse breath sounds bilaterally.   No Rales, no GREEN   RAINBOW DRAW GOLD   BLOOD CULTURE   BLOOD CULTURE                  [facility]      Patient had IV established, placed on cardiac monitor and pulse ox.   Chest x-ray performed which did reveal bilateral opacities consistent with Covid 19 infection/pneumonia fatigue and some headaches. Patient having body aches but no fevers or chills. Patient does admit to some chest pain when she coughs. No palpitations.        Physical Exam:    /64   Pulse 92   Temp 98.6 °F (37 °C) (Temporal)   Resp 26   Ht 5' 2\" ( Hypokalemia-we will replace per protocol.   Quality:  · DVT Prophylaxis: Lovenox  · CODE status: Full  · Anderson: N/A       Plan of care discussed with pt at bedside    Felix Kaplan DO  11/9/2020        Electronically signed by Marciana Buerger infusion     Date Action Dose Route User    11/10/2020 0441 New [address] (none) Intravenous Jovanny Juarez RN    11/9/2020 1530 New Bag (none) Intravenous Drew Pagan RN      11/10  Chief Complaint: SOB     S: Patient seen at bedside.  Pt breathing improvin Epic.     Medications:   • enoxaparin  40 mg Subcutaneous Daily   • docusate sodium  100 mg Oral BID   • remdesivir IVPB  100 mg Intravenous Q24H   • dexamethasone  6 mg Oral Daily         ASSESSMENT / PLAN:      1. COVID pneumonia- Will continue decadron 11/09/20 1130 — 98 34Abnormal  — 94 % — — — CC   11/09/20 1129 — — — — 93 % — Nasal cannula 4 L/min EM   11/09/20 1115 — 103 29Abnormal  150/63 85 %Abnormal  — — — EM   11/09/20 0935 — — — — — — None (Room air) — CC     EE EXTERNAL COVID-19 INFECTION [3

## 2020-11-10 NOTE — PROGRESS NOTES
[facility]                INFECTIOUS DISEASE PROGRESS NOTE    Robert Goldman Patient Status:  Inpatient    1955 MRN [id number]   [facility] 3NW-A Attending [facility] Day # 1 PCP Jazmine Farrar MD     Antibi [address] Encounter on 11/09/20   1.  BLOOD CULTURE     Status: None (Preliminary result)    Collection Time: 11/09/20  9:24 AM    Specimen: Blood,peripheral   Result Value Ref Range    Blood Culture Result No Growth 1 Day N/A

## 2020-11-10 NOTE — PROGRESS NOTES
[facility] [place] HOSPITAL[facility]  Progress Note     Tita Avendaño Patient Status:  Inpatient    1955 MRN [id number]   [facility] 3NW-A Attending Christine CarcamoDEQUAN [facility] Day # 1 PCP Tresa Cardenas MD     Chief Complaint: SOB    S: Patient seen Epic.    Medications:   • enoxaparin  40 mg Subcutaneous Daily   • docusate sodium  100 mg Oral BID   • remdesivir IVPB  100 mg Intravenous Q24H   • dexamethasone  6 mg Oral Daily       ASSESSMENT / PLAN:     1.  COVID pneumonia- Will continue decadron D2,

## 2020-11-10 NOTE — PLAN OF CARE
Pt is A&O, VSS, and denies pain. Mauritanian speaking. 2L NC with bilateral diminished lung sounds, . SOB with exertion. IS at bedside encouraged. Therapeutic proning. NSR on telemetry. SCDs in place.  Abdomen is soft and nontender with hypoactive bowel soun

## 2020-11-11 PROCEDURE — 99232 SBSQ HOSP IP/OBS MODERATE 35: CPT | Performed by: HOSPITALIST

## 2020-11-11 RX ORDER — BENZONATATE 100 MG/1
100 CAPSULE ORAL 3 TIMES DAILY PRN
Status: DISCONTINUED | OUTPATIENT
Start: 2020-11-11 | End: 2020-11-11

## 2020-11-11 NOTE — PLAN OF CARE
Assumed care at [address] pt on bed, on O2 4LHF, O2 sats >90%, down to 86% with activity. Dry strong cough, cough meds given. Denies pain. IVF. Monitoring Na level  Remdesivir, decadron  Labs in am    Problem: PAIN - ADULT  Goal: Verbalizes/displays rick Progressing    Problem: DISCHARGE PLANNING  Goal: Discharge to home or other facility with appropriate resources  Description: INTERVENTIONS:  - Identify barriers to discharge w/pt and caregiver  - Include patient/family/discharge partner in discharge plan Goal  Description: Patient's Short Term Goal: decrease O2 requirements    Interventions:   - monitor O2 sats and respiratory status  -Remdesivir  -decadron  -cough meds  -IVF  - See additional Care Plan goals for specific interventions  Outcome: Progressin

## 2020-11-11 NOTE — PROGRESS NOTES
[facility]                INFECTIOUS DISEASE PROGRESS NOTE    Ingrid Hernandez Patient Status:  Inpatient    1955 MRN [id number]   [facility] 3NW-A Attending [facility] Day # 2 PCP Dennie Banker, MD     Antibi AST 24 21 20   ALT 39 30 28   BILT 1.0 0.4 0.3   TP 7.9 7.3 6.6       No results found for: [facility] Encounter on 11/09/20   1.  BLOOD CULTURE     Status: None (Preliminary result)    Collection Time: 11/09/20  9:24 AM    Specimen:

## 2020-11-11 NOTE — PROGRESS NOTES
[facility]PITALIST  Progress Note     Twila Maria Patient Status:  Inpatient    1955 MRN [id number]   [facility] 3NW-A Attending Carey Warren MD   Hosp Day # 2 PCP Natalia Emerson MD     Chief Complaint: SOB    S: Patient feeling a li Detected (A) 11/04/2020    COVID19 DETECTED (A) 11/02/2020    COVID19 Not Detected 07/02/2020       Pro-Calcitonin  Recent Labs   Lab 11/09/20  0923   PCT 0.30*       Cardiac  Recent Labs   Lab 11/09/20  0923 11/09/20  1535 11/09/20  1827   TROP  --  <0.04

## 2020-11-12 PROCEDURE — 99232 SBSQ HOSP IP/OBS MODERATE 35: CPT | Performed by: HOSPITALIST

## 2020-11-12 RX ORDER — FUROSEMIDE 10 MG/ML
20 INJECTION INTRAMUSCULAR; INTRAVENOUS ONCE
Status: COMPLETED | OUTPATIENT
Start: 2020-11-12 | End: 2020-11-12

## 2020-11-12 RX ORDER — POTASSIUM CHLORIDE 20 MEQ/1
40 TABLET, EXTENDED RELEASE ORAL EVERY 4 HOURS
Status: COMPLETED | OUTPATIENT
Start: 2020-11-12 | End: 2020-11-12

## 2020-11-12 NOTE — PROGRESS NOTES
[facility]                INFECTIOUS DISEASE PROGRESS NOTE    Paris Soliz Patient Status:  Inpatient    1955 MRN [id number]   [facility] 3NW-A Attending Jody Epsteini[address] Day # 3 PCP Rosalind Wolf MD     Antibi AST 21 20 18   ALT 30 28 28   BILT 0.4 0.3 0.3   TP 7.3 6.6 7.0       No results found for: [address] Encounter on 11/09/20   1.  BLOOD CULTURE     Status: None (Preliminary result)    Collection Time: 11/09/20  9:24 AM    Specimen:

## 2020-11-12 NOTE — PLAN OF CARE
Assumed pt care around 1630. Pt Niuean speaking,  line used. Assessment unchanged from this am. Pt O2 sats while in chair mid 80s on 2 L O2 via NC. Pt instructed to lay prone, O2 increased to 6 L O2 via NC. O2 sats increased to >90%.  Pt educati

## 2020-11-12 NOTE — PLAN OF CARE
Pt a/ox4, Singaporean speaking, tele nsr, , 4L high kaylee 95%, dyspnea w/excretion, lungs clear and diminished bilat, bowel sounds active. IVF infusing. Pt complaint w/ poc. Call light within reach, will continue to monitor.        Problem: PAIN - ADULT  Goal: resources  Description: INTERVENTIONS:  - Identify barriers to discharge w/pt and caregiver  - Include patient/family/discharge partner in discharge planning  - Arrange for needed discharge resources and transportation as appropriate  - Identify discharge Care Plan goals for specific interventions  Outcome: Progressing

## 2020-11-12 NOTE — PROGRESS NOTES
Patient up to bathroom around 0300. Oxygen levels dropped. This RN went into room, had patient prone, moved oxygen up, and patient's oxygen level came back up >90%. Patient moved back to 4 L o2, satting 90%-92%. As patient falls asleep and coughing stops.

## 2020-11-12 NOTE — PROGRESS NOTES
[facility]PITAL[facility]  Progress Note     Yuliet Jarrodjose miguel Patient Status:  Inpatient    1955 MRN [id number]   [facility] 3NW-A Attending Alejandra Olvera MD   Hosp Day # 3 PCP Kam Beach MD     Chief Complaint: SOB    S: Patient remains on 4 COVID-19 Lab Results    COVID-19  Lab Results   Component Value Date    COVID19 Detected (A) 11/04/2020    COVID19 DETECTED (A) 11/02/2020    COVID19 Not Detected 07/02/2020       Pro-Calcitonin  Recent Labs   Lab 11/09/20  0923   PCT 0.30*       Cardi

## 2020-11-12 NOTE — PLAN OF CARE
Pt alert and oriented x4 primarily Swazi speaking. VS- afebrile. Denies N/V/D. Denies: pain. Tele: NSR. Received pt on 4L NC. Pt currently maintaining O2 sats at 97% on 2L NC. . PO decadron. Lovenox. SCDs. IV remedesivir. IV saline locked.  Voids kb strengthening/mobility  - Encourage toileting schedule  Outcome: Progressing     Problem: DISCHARGE PLANNING  Goal: Discharge to home or other facility with appropriate resources  Description: INTERVENTIONS:  - Identify barriers to discharge w/pt and careg Patient's Short Term Goal: decrease O2 requirements    Interventions:   - monitor O2 sats and respiratory status  -Remdesivir  -decadron  -cough meds  -IVF  - See additional Care Plan goals for specific interventions  Outcome: Progressing

## 2020-11-13 ENCOUNTER — APPOINTMENT (OUTPATIENT)
Dept: GENERAL RADIOLOGY | Facility: HOSPITAL | Age: 65
DRG: 177 | End: 2020-11-13
Attending: HOSPITALIST
Payer: COMMERCIAL

## 2020-11-13 PROCEDURE — 71045 X-RAY EXAM CHEST 1 VIEW: CPT | Performed by: HOSPITALIST

## 2020-11-13 PROCEDURE — 99232 SBSQ HOSP IP/OBS MODERATE 35: CPT | Performed by: HOSPITALIST

## 2020-11-13 RX ORDER — GUAIFENESIN 600 MG
600 TABLET, EXTENDED RELEASE 12 HR ORAL 2 TIMES DAILY
Status: DISCONTINUED | OUTPATIENT
Start: 2020-11-13 | End: 2020-11-17

## 2020-11-13 RX ORDER — ALBUTEROL SULFATE 90 UG/1
4 AEROSOL, METERED RESPIRATORY (INHALATION) 4 TIMES DAILY
Status: DISCONTINUED | OUTPATIENT
Start: 2020-11-13 | End: 2020-11-17

## 2020-11-13 RX ORDER — FUROSEMIDE 10 MG/ML
20 INJECTION INTRAMUSCULAR; INTRAVENOUS ONCE
Status: COMPLETED | OUTPATIENT
Start: 2020-11-13 | End: 2020-11-13

## 2020-11-13 NOTE — PLAN OF CARE
Problem: Covid +/ PNA  Data: Ax04. Primarily Khmer speaking. Telemetry NSR.  on 5L-6L high flow. Patient desats during ambulation. Consistently pronining. Afebrile. Dry cough. Wears a collar brace, acetaminophen was given. Continent. No n/v/d.  Ambulat strengthening/mobility  - Encourage toileting schedule  Outcome: Progressing     Problem: DISCHARGE PLANNING  Goal: Discharge to home or other facility with appropriate resources  Description: INTERVENTIONS:  - Identify barriers to discharge w/pt and careg Patient's Short Term Goal: decrease O2 requirements  11/12 noc- wean 02    Interventions:   - monitor O2 sats and respiratory status  -Remdesivir  -decadron  -cough meds  -IVF  - See additional Care Plan goals for specific interventions  Outcome: Progressi

## 2020-11-13 NOTE — PLAN OF CARE
Pt alert and oriented x4 primarily Italian speaking. VS- afebrile. Denies N/V/D. Denies: pain. Tele: NSR. Received pt on 4L NC. Pt currently maintaining O2 sats at 93% on 15L NC. . PO decadron. Lovenox. SCDs. IV remedesivir last dose today.  IV salin to assist with strengthening/mobility  - Encourage toileting schedule  Outcome: Progressing     Problem: DISCHARGE PLANNING  Goal: Discharge to home or other facility with appropriate resources  Description: INTERVENTIONS:  - Identify barriers to discharge Goal  Description: Patient's Short Term Goal: decrease O2 requirements  11/12 noc- wean 02    Interventions:   - monitor O2 sats and respiratory status  -Remdesivir  -decadron  -cough meds  -IVF  - See additional Care Plan goals for specific interventions

## 2020-11-13 NOTE — PAYOR COMM NOTE
--------------  CONTINUED STAY REVIEW    Payor: SHARRI PPPANFILO  Subscriber #:  [id number]  Authorization Number: [id number]    Admit date: 11/9/20  Admit time: 1514    Admitting Physician: Fozia Alatorre MD  Attending Physician:  Anderson Toney MD  Primary Ca mg/dL).     No results for input(s): PTP, INR in the last 168 hours.        COVID-19 Lab Results     COVID-19        Lab Results   Component Value Date     COVID19 Detected (A) 11/04/2020     COVID19 DETECTED (A) 11/02/2020     COVID19 Not Detected 07/02/2 AM          MEDICATIONS ADMINISTERED IN LAST 1 DAY:  acetaminophen (TYLENOL) tab 650 mg     Date Action Dose Route User    11/12/2020 2339 Given 650 mg Oral Mayola Bernheim, JR      benzonatate (TESSALON) cap 100 mg     Date Action Dose Route User    11

## 2020-11-13 NOTE — PROGRESS NOTES
[facility]                INFECTIOUS DISEASE PROGRESS NOTE    Anshu Cassidy Patient Status:  Inpatient    1955 MRN [id number]   [facility] 3NW-A Attending Nick Castro [facility] Day # 4 PCP Phyllis Zuniga MD     Antibi 78 77 79   AST 20 18 17   ALT 28 28 29   BILT 0.3 0.3 0.4   TP 6.6 7.0 7.4       No results found for: [facility] Encounter on 11/09/20   1.  BLOOD CULTURE     Status: None (Preliminary result)    Collection Time: 11/09/20  9:24 AM    S

## 2020-11-13 NOTE — PROGRESS NOTES
[facility]PITAL[facility]  Progress Note     Theo Hua Patient Status:  Inpatient    1955 MRN [id number]   [facility] 3NW-A Attending Shawanda Woods MD   Hosp Day # 4 PCP Gino Nolen MD     Chief Complaint: SOB    S: feels the same    Re Results   Component Value Date    COVID19 Detected (A) 11/04/2020    COVID19 DETECTED (A) 11/02/2020    COVID19 Not Detected 07/02/2020       Pro-Calcitonin  Recent Labs   Lab 11/09/20  0923   PCT 0.30*       Cardiac  Recent Labs   Lab 11/09/20  6625 11/09

## 2020-11-14 ENCOUNTER — APPOINTMENT (OUTPATIENT)
Dept: CT IMAGING | Facility: HOSPITAL | Age: 65
DRG: 177 | End: 2020-11-14
Attending: INTERNAL MEDICINE
Payer: COMMERCIAL

## 2020-11-14 PROCEDURE — 71275 CT ANGIOGRAPHY CHEST: CPT | Performed by: INTERNAL MEDICINE

## 2020-11-14 PROCEDURE — 99232 SBSQ HOSP IP/OBS MODERATE 35: CPT | Performed by: HOSPITALIST

## 2020-11-14 PROCEDURE — XW033H5 INTRODUCTION OF TOCILIZUMAB INTO PERIPHERAL VEIN, PERCUTANEOUS APPROACH, NEW TECHNOLOGY GROUP 5: ICD-10-PCS | Performed by: HOSPITALIST

## 2020-11-14 RX ORDER — DEXAMETHASONE SODIUM PHOSPHATE 4 MG/ML
6 VIAL (ML) INJECTION
Status: DISCONTINUED | OUTPATIENT
Start: 2020-11-14 | End: 2020-11-17

## 2020-11-14 RX ORDER — FUROSEMIDE 10 MG/ML
20 INJECTION INTRAMUSCULAR; INTRAVENOUS ONCE
Status: COMPLETED | OUTPATIENT
Start: 2020-11-14 | End: 2020-11-14

## 2020-11-14 RX ORDER — POTASSIUM CHLORIDE 20 MEQ/1
40 TABLET, EXTENDED RELEASE ORAL ONCE
Status: COMPLETED | OUTPATIENT
Start: 2020-11-14 | End: 2020-11-14

## 2020-11-14 NOTE — PROGRESS NOTES
[facility]PITAL[facility]  Progress Note     Philippe Steward Patient Status:  Inpatient    1955 MRN [id number]   [facility] 3NW-A Attending Daryl Das MD   Hosp Day # 5 PCP Sulma Davis MD     Chief Complaint: SOB    S: no new complaints, a INR in the last 168 hours.          COVID-19 Lab Results    COVID-19  Lab Results   Component Value Date    COVID19 Detected (A) 11/04/2020    COVID19 DETECTED (A) 11/02/2020    COVID19 Not Detected 07/02/2020       Pro-Calcitonin  Recent Labs   Lab 11/09/2

## 2020-11-14 NOTE — PROGRESS NOTES
Pt alert and oriented x4 primarily Costa Rican speaking. VS- afebrile. Denies N/V/D. Denies: pain.  Tele: NSR. Received pt on 5L NC. Pt work of breathing increased. Pt was than proned on 5L NC, maintaining O2 at 85%. Since pt was still unable to meet O2 needs.

## 2020-11-14 NOTE — PLAN OF CARE
0045: Received patient AxO x4, primarily French-speaking. VSS. Received pt on 15L HFNC - currently weaned to 2-3L while proning. Pt maintaining O2 saturations >95% while proning. NSR, tele. . PRN tylenol given for throat pain. Moderate relief noted.  Malia Selby responsible for managing their own health  - Refer to Case Management Department for coordinating discharge planning if the patient needs post-hospital services based on physician/LIP order or complex needs related to functional status, cognitive ability o appropriate and evaluate response  - Consider cultural and social influences on pain and pain management  - Manage/alleviate anxiety  - Utilize distraction and/or relaxation techniques  - Monitor for opioid side effects  - Notify MD/LIP if interventions un

## 2020-11-14 NOTE — PLAN OF CARE
Pt transferred to unit approx 1400. AO x 4. Transitioned to HFNC. Afebrile. NSR noted on monitor. Tolerating diet per order. No BM. Up to bathroom to void as tolerated. No c/o pain or discomfort. Family updated on pt status and plan of care.  Will continu

## 2020-11-14 NOTE — PROGRESS NOTES
[facility]                INFECTIOUS DISEASE PROGRESS NOTE    Aaron Angeles Patient Status:  Inpatient    1955 MRN [id number]   [facility] 3NW-A Attending Jose De Jesus Neville [facility] Day # 5 PCP Darrius Lang MD     Antibi 79 78   AST 18 17 13*   ALT 28 29 26   BILT 0.3 0.4 0.4   TP 7.0 7.4 7.6       No results found for: [facility] Encounter on 11/09/20   1.  BLOOD CULTURE     Status: None    Collection Time: 11/09/20  9:24 AM    Specimen: Blood,peripher

## 2020-11-14 NOTE — CONSULTS
[facility] Note  [facility]  Report of Consultation    Manuela Vivar Patient Status:  Inpatient    1955 MRN [id number]   [facility] 3NW-A Attending Melida Garcia MD   [facility] Day # 5 [facility] Allergies    Medications:  • Albuterol Sulfate HFA  4 puff Inhalation QID   • guaiFENesin ER  600 mg Oral BID   • dexamethasone  6 mg Oral 2 times per day   • enoxaparin  40 mg Subcutaneous Daily   • docusate sodium  100 mg Oral BID     Butalbital-APAP-Caf (36.8 °C) Oral 72 20 94 %   11/13/20 2210 — — — — — 94 %   11/13/20 2147 — — — — — 99 %   11/13/20 1955 — — — — — 94 %   11/13/20 1944 111/40 98.4 °F (36.9 °C) Oral 72 18 93 %   11/13/20 1630 126/59 98.1 °F (36.7 °C) Oral 79 20 94 %   11/13/20 1115 140/64 last 168 hours. Recent Labs   Lab 11/09/20  0923 11/09/20  1535 11/09/20  1827   TROP  --  <0.045 <0.045   CK 94  --   --      No results for input(s): PT, INR, PTT in the last 168 hours.     COVID-19 Lab Results    COVID-19  Lab Results   Component Value edema and other pneumonias. Stable heart size. No sizable effusion or pneumothorax.    Dictated by (CST): Davis Rosario MD on 11/13/2020 at 9:30 AM     Finalized by (CST): Davis Rosario MD on 11/13/2020 at 9:34 AM       Chest imaging reviewed which sh

## 2020-11-15 PROCEDURE — 99232 SBSQ HOSP IP/OBS MODERATE 35: CPT | Performed by: HOSPITALIST

## 2020-11-15 RX ORDER — LORAZEPAM 2 MG/ML
INJECTION INTRAMUSCULAR
Status: DISPENSED
Start: 2020-11-15 | End: 2020-11-16

## 2020-11-15 NOTE — PROGRESS NOTES
[facility] Pulmonary/Critical Care Progress Note     SUBJECTIVE/Interval history: All events, procedures, notes reviewed. No acute events overnight, she has been weaned to 12L overnight/this morning.  She reports feeling 104 104   CO2 23.0 24.0 23.0     Recent Labs   Lab 11/13/20  0804 11/14/20  0637 11/15/20  0508   RBC 3.92 3.91 3.83   HGB 12.4 12.1 11.9*   HCT 36.9 35.3 35.9   MCV 94.1 90.3 93.7   MCH 31.6 30.9 31.1   MCHC 33.6 34.3 33.1   RDW 12.3 12.1 12.2   NEPRELIM Angiography, Chest (cpt=71275)    Result Date: 11/14/2020  CONCLUSION:  Patchy bilateral pulmonary infiltrates with bilateral consolidation consistent with COVID-19 pneumonia. No pulmonary embolism visualized.     Dictated by (CST): Zane Drake MD on 05 10L now this am  · Continue dexamethasone day 7 of 10 - escalate to BID dosing 11/14  · ID following -completed remdesivir 11/13  · Follow inflammatory markers and d dimer closely; hold on tocilizumab given significant improvement  · Continue proning with

## 2020-11-15 NOTE — PROGRESS NOTES
[facility]                INFECTIOUS DISEASE PROGRESS NOTE    Joss Rogers Patient Status:  Inpatient    1955 MRN [id number]   [facility] 3NW-A Attending [facility] Day # 6 PCP Jabari Richmond MD     Antibi Result Value Ref Range    Blood Culture Result No Growth 5 Days N/A           Problem list reviewed:  Patient Active Problem List:     Encounters for unspecified administrative purpose     Cervical radiculopathy     Benign essential HTN     Hyperlipidemi

## 2020-11-15 NOTE — PROGRESS NOTES
[facility]PITAL[facility]  Progress Note     Theo Hua Patient Status:  Inpatient    1955 MRN [id number]   [facility] 3NW-A Attending Shawanda Woods MD   Hosp Day # 6 PCP Gino Nolen MD     Chief Complaint: SOB    S: improved oxygenation COVID-19 Lab Results    COVID-19  Lab Results   Component Value Date    COVID19 Detected (A) 11/04/2020    COVID19 DETECTED (A) 11/02/2020    COVID19 Not Detected 07/02/2020       Pro-Calcitonin  Recent Labs   Lab 11/09/20  0923   PCT 0.30*       Cardi

## 2020-11-15 NOTE — PLAN OF CARE
Assumed care at [address] [place] [address]. Alert. See flowsheet for further assessment. Received on 12L HFNC. SR. VSS. Cardiac diet. Activity as tolerated.

## 2020-11-16 PROCEDURE — 99232 SBSQ HOSP IP/OBS MODERATE 35: CPT | Performed by: HOSPITALIST

## 2020-11-16 RX ORDER — POTASSIUM CHLORIDE 20 MEQ/1
40 TABLET, EXTENDED RELEASE ORAL EVERY 4 HOURS
Status: COMPLETED | OUTPATIENT
Start: 2020-11-16 | End: 2020-11-16

## 2020-11-16 NOTE — PAYOR COMM NOTE
--------------  CONTINUED STAY REVIEW    Payor: [employer] PPO  Subscriber #:  [id number]  Authorization Number: [id number]    Admit date: 11/9/20  Admit time: 1514    Admitting Physician: Gomez Santos MD  Attending Physician:  Burgess Adira MD  Primary Ca for: [facility]  Microbiology     Recent Results         Hospital Encounter on 11/09/20   1.  BLOOD CULTURE     Status: None     Collection Time: 11/09/20  9:24 AM     Specimen: Blood,peripheral   Result Value Ref Range     Blood Culture Result No Growth 5 Days rebound or guarding. Neurologic: No focal neurological deficits. Musculoskeletal: Moves all extremities.   Extremities: No edema.     Diagnostic Data:       Labs:          Recent Labs   Lab 11/11/20  0749 11/12/20  2912 11/13/20  0804 11/14/20  5365 11/1 Pneumonia with acute hypoxic respiratory failure  1. S/p Remdesivir  2. Decadron D7  3. Trend inflammatory markers  4. ID on consult  5. Wean o2 as able  2. Essential hypertension  1. stable  3. Hyperlipidemia  1. On statin  4.  Hyponatremia  1. improved  5         left    • APPENDECTOMY       •        • CARPAL TUNNEL RELEASE Bilateral     • CHOLECYSTECTOMY       • POSTERIOR CERVICAL FUSION BG 1 LEVEL N/A 6/15/2020     Performed by Lucien Ascencio MD at [address] [place] [address] Dr   • 1100 . [employer] dysuria, hematuria, urinary retention. Behavioral/Psych: Normal affect, mood, speech.  No AVH, No SI/HI     All other review of systems are negative.     Vital signs in last 24 hours:  Patient Vitals for the past 24 hrs:    BP Temp Temp src Pulse Resp SpO2 11/13/20  0804 11/14/20  0637   GLU 95 89 114*   BUN 13 17 18   CREATSERUM 0.60 0.71 0.60   GFRAA 110 103 110   GFRNAA 95 89 95   CA 8.2* 9.3 9.4    138 136   K 3.3* 3.9 3.8    108 104   CO2 23.0 23.0 24.0            Recent Labs   Lab 11/12/20        Recent Labs   Lab 11/10/20  0515   COLORUR Yellow   CLARITY Clear   SPECGRAVITY 1.014   GLUUR 50 *   BILUR Negative   KETUR Trace*   BLOODURINE Negative   PHURINE 5.0   PROUR Negative   UROBILINOGEN <2.0   NITRITE Negative   LEUUR Negative     you.     Discussed above with patient, RN and Dr. Elizabeth Nash MD  SLA  CCM time: 50 minutes.  The patient is critically ill and at high risk for clinical deterioration.               Electronically signed by Cintia Morin MD at 11/14/2020

## 2020-11-16 NOTE — PROGRESS NOTES
Patient transferred to room 325 in stable condition. Patient's chart, inhaler and personal belongings were sent to new room with patient. Patient states daughter is already aware of her transfer to new room.

## 2020-11-16 NOTE — PLAN OF CARE
Assumed patient care at 0730 this AM. Patient A&O x4, primarily Rwandan speaking, conversational in [facility]. Received pt on 5L O2 kobi nasal canula, weaned to 4L O2 at rest, will continue to wean as tolerated.  Up in bathroom to wash up with PCT this AM, req behaviors that affect risk of falls.   - [place] fall precautions as indicated by assessment.  - Educate pt/family on patient safety including physical limitations  - Instruct pt to call for assistance with activity based on assessment  - Modify environme patient  Outcome: Progressing     Problem: Patient/Family Goals  Goal: Patient/Family Long Term Goal  Description: Patient's Long Term Goal: free from Covid    Interventions:  - continue POC  -monitor labs/vital signs  -consults  - See additional Care Plan

## 2020-11-16 NOTE — PLAN OF CARE
Problem: PAIN - ADULT  Goal: Verbalizes/displays adequate comfort level or patient's stated pain goal  Description: INTERVENTIONS:  - Encourage pt to monitor pain and request assistance  - Assess pain using appropriate pain scale  - Administer analgesics appropriate  - Identify discharge learning needs (meds, wound care, etc)  - Arrange for interpreters to assist at discharge as needed  - Consider post-discharge preferences of patient/family/discharge partner  - Complete POLST form as appropriate  - Assess optimal ventilation and oxygenation  Description: INTERVENTIONS:  - Assess for changes in respiratory status  - Assess for changes in mentation and behavior  - Position to facilitate oxygenation and minimize respiratory effort  - Oxygen supplementation bas

## 2020-11-16 NOTE — PLAN OF CARE
Received pt AO x 4. Continues on HFNC. Afebrile. NSR noted on monitor. Tolerating diet per order. No BM. Up to bathroom to void as tolerated. No c/o pain or discomfort. Family updated on pt status and plan of care. Will continue to monitor.

## 2020-11-16 NOTE — PROGRESS NOTES
[facility] HOSPITALIST  Progress Note     [facility] Patient Status:  Inpatient    1955 MRN [id number]   [facility] 3NW-A Attending Wing Hagan MD   Hosp Day # 7 PCP Jen Paulino MD     Chief Complaint: SOB    S: feels better today, Results    COVID-19  Lab Results   Component Value Date    COVID19 Detected (A) 11/04/2020    COVID19 DETECTED (A) 11/02/2020    COVID19 Not Detected 07/02/2020       Pro-Calcitonin  No results for input(s): PCT in the last 168 hours.     Cardiac  Recent La

## 2020-11-16 NOTE — PROGRESS NOTES
[facility]  Progress Note    Joaquim Ott Patient Status:  Inpatient    1955 MRN [id number]   [facility] 3NW-A Attending Jailene Daigle MD   [facility] Day # 7 PCP Adri Skinner MD     Subjective:  Joaquim Ott is a(n) 76year old femal 133* 132*   K 3.8 4.2 3.6    104 102   CO2 24.0 23.0 22.0   BUN 18 23* 23*   CREATSERUM 0.60 0.59 0.83     No results for input(s): PTP, INR, PTT in the last 168 hours.     Cultures: Blood cultures remain negative sputum was contaminated    Radiology:

## 2020-11-16 NOTE — DIETARY NOTE
[facility]   CLINICAL NUTRITION    Paris Soliz     Admitting diagnosis:  Hypokalemia [E87.6]  Hyponatremia [E87.1]  Hypoxia [R09.02]  Pneumonia due to COVID-19 virus [U07.1, J12.89]    Ht: 157.5 cm (5' 2\")  Wt: 68 kg (150 lb).  This is 136% of IBW  B

## 2020-11-16 NOTE — PROGRESS NOTES
[facility]                INFECTIOUS DISEASE PROGRESS NOTE    Wilbern Smoke Patient Status:  Inpatient    1955 MRN [id number]   [facility] 3NW-A Attending [facility] Day # 7 PCP Tashi Crandall MD     Antibi Collection Time: 11/09/20  9:24 AM    Specimen: Blood,peripheral   Result Value Ref Range    Blood Culture Result No Growth 5 Days N/A           Problem list reviewed:  Patient Active Problem List:     Encounters for unspecified administrative purpose

## 2020-11-17 VITALS
HEIGHT: 62 IN | BODY MASS INDEX: 27.63 KG/M2 | DIASTOLIC BLOOD PRESSURE: 58 MMHG | RESPIRATION RATE: 19 BRPM | HEART RATE: 64 BPM | OXYGEN SATURATION: 96 % | TEMPERATURE: 96 F | WEIGHT: 150.13 LBS | SYSTOLIC BLOOD PRESSURE: 119 MMHG

## 2020-11-17 PROCEDURE — 99232 SBSQ HOSP IP/OBS MODERATE 35: CPT | Performed by: HOSPITALIST

## 2020-11-17 RX ORDER — DEXAMETHASONE 6 MG/1
6 TABLET ORAL DAILY
Qty: 2 TABLET | Refills: 0 | Status: SHIPPED | OUTPATIENT
Start: 2020-11-18 | End: 2020-11-20

## 2020-11-17 RX ORDER — DEXAMETHASONE SODIUM PHOSPHATE 4 MG/ML
6 VIAL (ML) INJECTION EVERY 24 HOURS
Status: DISCONTINUED | OUTPATIENT
Start: 2020-11-18 | End: 2020-11-17

## 2020-11-17 NOTE — PROGRESS NOTES
[facility]                INFECTIOUS DISEASE PROGRESS NOTE    Twila Maria Patient Status:  Inpatient    1955 MRN [id number]   [facility] 3NW-A Attending [facility] Day # 8 PCP Natalia Emerson MD     Antibi Collection Time: 11/09/20  9:24 AM    Specimen: Blood,peripheral   Result Value Ref Range    Blood Culture Result No Growth 5 Days N/A           Problem list reviewed:  Patient Active Problem List:     Encounters for unspecified administrative purpose

## 2020-11-17 NOTE — PROGRESS NOTES
[facility] Pulmonary/Critical Care Progress Note     SUBJECTIVE/Interval history: All events, procedures, notes reviewed. Pt is doing well onr oom air.  Denies cp or sob      Review of Systems:   A comprehensive 14 point 11/15/20  0508 11/16/20  0933 11/17/20  0909   RBC 3.83 3.98 4.02   HGB 11.9* 12.4 12.5   HCT 35.9 36.5 37.3   MCV 93.7 91.7 92.8   MCH 31.1 31.2 31.1   MCHC 33.1 34.0 33.5   RDW 12.2 12.1 12.0   NEPRELIM 8.61* 6.20 5.90   WBC 10.1 8.5 8.5   .0 407. HLD  ·     PLAN    · Inflammatory markers improved.  Pt doing well on room air even with ambulation  · May decrease dexamethasone to daily now  · Continue bromchodilator  · Stable for discharge from pulmonary perspective  · Proph: enoxaparin      Lauren Nunes

## 2020-11-17 NOTE — PLAN OF CARE
Assumed care at 7:30am.  KENY SL. SCDs. Cardiac monitoring. O2 walk completed. Daughter updated on plan of care. Will con't to monitor patient.     Problem: RISK FOR INFECTION - ADULT  Goal: Absence of fever/infection during anticipated neutropenic period

## 2020-11-17 NOTE — PROGRESS NOTES
[facility] Note: Route Optimization for Dexamethasone (Decadron)    Patient is currently on Dexamethasone (Decadron) 6 mg IV every 24 hours.    The patient meets the criteria to convert to the oral equivalent as established by the IV to Oral conversion pr

## 2020-11-17 NOTE — PLAN OF CARE
Pt alert and orientatedx4. Mostly Ecu[place] speaking but also communic[facility]. 3 liters of oxygen, nasal cannula. O2 sats in the high 90s. Pulse Ox. Encouraged to use IS. Lung sounds diminished.  Offered tessalon and robatussin PRN but patient refused Instruct pt to call for assistance with activity based on assessment  - Modify environment to reduce risk of injury  - Provide assistive devices as appropriate  - Consider OT/PT consult to assist with strengthening/mobility  - Encourage toileting schedule Covid    Interventions:  - continue POC  -monitor labs/vital signs  -consults  - See additional Care Plan goals for specific interventions  Outcome: Progressing  Goal: Patient/Family Short Term Goal  Description: Patient's Short Term Goal: decrease O2 requ activity based on assessment  - Modify environment to reduce risk of injury  - Provide assistive devices as appropriate  - Consider OT/PT consult to assist with strengthening/mobility  - Encourage toileting schedule  Outcome: Progressing     Problem: [facility] labs/vital signs  -consults  - See additional Care Plan goals for specific interventions  Outcome: Progressing

## 2020-11-17 NOTE — PROGRESS NOTES
11/17/20 1330   Mobility   O2 walk?  Yes   SPO2 on Room Air at Rest 91   SPO2 Ambulation on Room Air 90

## 2020-11-17 NOTE — PAYOR COMM NOTE
--------------  CONTINUED STAY REVIEW    Payor: [facility] PPO  Subscriber #:  [id number]  Authorization Number: [id number]    Admit date: 11/9/20  Admit time: 1514    Admitting Physician: Marylin Alamo MD  Attending Physician:  Gene Gibson MD  Primary Ca (based on SCr of 0.83 mg/dL).     No results for input(s): PTP, INR in the last 168 hours.        COVID-19 Lab Results     COVID-19        Lab Results   Component Value Date     COVID19 Detected (A) 11/04/2020     COVID19 DETECTED (A) 11/02/2020     COVID1 11/17/2020 1338 Given 4 puff Inhalation Nila Souza RN    11/17/2020 0847 Given 4 puff Inhalation Nila Souza RN    11/16/2020 2140 Given 4 puff Inhalation Thalia Zuñiga RN    11/16/2020 1740 Given 4 puff Inhalation Mary Pisano RN      de

## 2020-11-17 NOTE — PROGRESS NOTES
NURSING DISCHARGE NOTE    PIV removed by RN, patient tolerated well. Discharge instructions / medications/ follow-up reviewed with patient and patient's daughter via phone. All questions were answered and patient verbalized understanding.     Ruth Jackson

## 2020-11-18 ENCOUNTER — PATIENT OUTREACH (OUTPATIENT)
Dept: CASE MANAGEMENT | Age: 65
End: 2020-11-18

## 2020-11-18 NOTE — PROGRESS NOTES
1st attempt at scheduling pts follow up appointment with PCP office         Dr. Nubia Bautista.   [facility] [address]  [phone]      Pt did not answer

## 2020-11-19 NOTE — PROGRESS NOTES
2nd  attempt at scheduling pts follow up appointment with PCP office         Dr. Eric Rudolph.   [facility] [address]  [phone]            PT did not answer left message

## 2020-11-19 NOTE — DISCHARGE SUMMARY
[facility] HOSPITALIST  DISCHARGE SUMMARY     Joaquim Ott Patient Status:  Inpatient    1955 MRN [id number]   [facility] 3NW-A Attending No att. providers found   Hosp Day # 8 PCP Adri Skinner MD     Date of Admission: 2020  Date of Prescription details   alendronate 70 MG Tabs  Commonly known as: FOSAMAX      Take 70 mg by mouth once a week. Refills: 0     atorvastatin 20 MG Tabs  Commonly known as: LIPITOR      Take 20 mg by mouth nightly.    Refills: 0     CALCIUM OR      Take 1 t

## 2020-11-20 NOTE — PROGRESS NOTES
3rd attempt at scheduling pts follow up appointment with PCP office         Dr. Ed Schreiber.   [facility] [address]  [phone]    Spoke with pt advise she will make appointment

## 2020-12-03 ENCOUNTER — HOSPITAL ENCOUNTER (OUTPATIENT)
Dept: CT IMAGING | Age: 65
Discharge: HOME | End: 2020-12-03
Attending: NEUROLOGICAL SURGERY
Payer: COMMERCIAL

## 2020-12-03 DIAGNOSIS — M48.02 CERVICAL SPINAL STENOSIS: ICD-10-CM

## 2020-12-03 PROCEDURE — 72125 CT NECK SPINE W/O DYE: CPT | Performed by: NEUROLOGICAL SURGERY

## 2021-01-05 ENCOUNTER — TELEPHONE (OUTPATIENT)
Dept: FAMILY MEDICINE CLINIC | Facility: CLINIC | Age: 66
End: 2021-01-05

## 2021-01-05 ENCOUNTER — OFFICE VISIT (OUTPATIENT)
Dept: FAMILY MEDICINE CLINIC | Facility: CLINIC | Age: 66
End: 2021-01-05
Payer: COMMERCIAL

## 2021-01-05 VITALS
DIASTOLIC BLOOD PRESSURE: 78 MMHG | HEART RATE: 80 BPM | HEIGHT: 60.43 IN | TEMPERATURE: 97 F | BODY MASS INDEX: 29.07 KG/M2 | WEIGHT: 150 LBS | OXYGEN SATURATION: 98 % | SYSTOLIC BLOOD PRESSURE: 148 MMHG

## 2021-01-05 DIAGNOSIS — U07.1 PNEUMONIA DUE TO COVID-19 VIRUS: ICD-10-CM

## 2021-01-05 DIAGNOSIS — I10 ESSENTIAL HYPERTENSION: ICD-10-CM

## 2021-01-05 DIAGNOSIS — J12.82 PNEUMONIA DUE TO COVID-19 VIRUS: ICD-10-CM

## 2021-01-05 DIAGNOSIS — Z00.00 ROUTINE GENERAL MEDICAL EXAMINATION AT A HEALTH CARE FACILITY: Primary | ICD-10-CM

## 2021-01-05 PROCEDURE — 3078F DIAST BP <80 MM HG: CPT | Performed by: FAMILY MEDICINE

## 2021-01-05 PROCEDURE — 3008F BODY MASS INDEX DOCD: CPT | Performed by: FAMILY MEDICINE

## 2021-01-05 PROCEDURE — 99203 OFFICE O/P NEW LOW 30 MIN: CPT | Performed by: FAMILY MEDICINE

## 2021-01-05 PROCEDURE — 99387 INIT PM E/M NEW PAT 65+ YRS: CPT | Performed by: FAMILY MEDICINE

## 2021-01-05 PROCEDURE — 3077F SYST BP >= 140 MM HG: CPT | Performed by: FAMILY MEDICINE

## 2021-01-05 RX ORDER — FLUTICASONE PROPIONATE 50 MCG
2 SPRAY, SUSPENSION (ML) NASAL DAILY
Qty: 1 BOTTLE | Refills: 2 | Status: SHIPPED | OUTPATIENT
Start: 2021-01-05 | End: 2021-04-05

## 2021-01-05 RX ORDER — ALBUTEROL SULFATE 90 UG/1
2 AEROSOL, METERED RESPIRATORY (INHALATION) EVERY 6 HOURS PRN
Qty: 1 INHALER | Refills: 0 | Status: SHIPPED | OUTPATIENT
Start: 2021-01-05 | End: 2021-02-09 | Stop reason: ALTCHOICE

## 2021-01-05 NOTE — TELEPHONE ENCOUNTER
Patient signed HIPAA medical records authorization form for the Facility identified below to disclose patient health information to [address] [facility]:     Dr. Richey [place], [address] [place] [address], [address] [place] [address]  Phone: [phone]  Fax # [phone]    Spe

## 2021-01-05 NOTE — PROGRESS NOTES
Joss Rogers is a 76year old female who is here for Patient presents with:  Wellness Visit      HPI:     1. Routine general medical examination at a health care facility  -due for wellness    2.  Essential hypertension  -bp elevated today  -taking telmis Encounters for unspecified administrative purpose     Cervical radiculopathy     Benign essential HTN     Hyperlipidemia     Hyponatremia     Hypokalemia     Hyperglycemia     Sepsis (HCC)     Dyslipidemia     Fever, unspecified fever cause     Abdominal p LEVEL N/A 6/15/2020    Performed by Cole Corley MD at [facility] MAIN OR   • SPINE SURGERY PROCEDURE UNLISTED        Social History:    Social History    Tobacco Use      Smoking status: Never Smoker      Smokeless tobacco: Never Used    Alcohol use: Yes management  -albuterol prn  -f/u in 1 month        Orders This Visit:  Orders Placed This Encounter      Comp Metabolic Panel (14) [E]      Lipid Panel [E]      TSH W Reflex To Free T4 [E]      Meds This Visit:  Requested Prescriptions     Signed Prescript

## 2021-01-05 NOTE — PATIENT INSTRUCTIONS
Empeza flonase cada noche  Empeza generico de claritin or zyrtec cada kerri (sin receta)    Conitnua albuterol cuando necessita para collins pulmones    va a lab en ayunas para pruebas de mona    Regresa en un mes, antes si necessita

## 2021-02-03 ENCOUNTER — LAB ENCOUNTER (OUTPATIENT)
Dept: LAB | Age: 66
End: 2021-02-03
Attending: FAMILY MEDICINE
Payer: COMMERCIAL

## 2021-02-03 LAB
ALBUMIN SERPL-MCNC: 4.1 G/DL (ref 3.4–5)
ALBUMIN/GLOB SERPL: 1.2 {RATIO} (ref 1–2)
ALP LIVER SERPL-CCNC: 55 U/L
ALT SERPL-CCNC: 32 U/L
ANION GAP SERPL CALC-SCNC: 4 MMOL/L (ref 0–18)
AST SERPL-CCNC: 15 U/L (ref 15–37)
BILIRUB SERPL-MCNC: 0.6 MG/DL (ref 0.1–2)
BUN BLD-MCNC: 12 MG/DL (ref 7–18)
BUN/CREAT SERPL: 18.5 (ref 10–20)
CALCIUM BLD-MCNC: 9 MG/DL (ref 8.5–10.1)
CHLORIDE SERPL-SCNC: 107 MMOL/L (ref 98–112)
CHOLEST SMN-MCNC: 170 MG/DL (ref ?–200)
CO2 SERPL-SCNC: 29 MMOL/L (ref 21–32)
CREAT BLD-MCNC: 0.65 MG/DL
GLOBULIN PLAS-MCNC: 3.3 G/DL (ref 2.8–4.4)
GLUCOSE BLD-MCNC: 100 MG/DL (ref 70–99)
HDLC SERPL-MCNC: 43 MG/DL (ref 40–59)
LDLC SERPL CALC-MCNC: 87 MG/DL (ref ?–100)
M PROTEIN MFR SERPL ELPH: 7.4 G/DL (ref 6.4–8.2)
NONHDLC SERPL-MCNC: 127 MG/DL (ref ?–130)
OSMOLALITY SERPL CALC.SUM OF ELEC: 290 MOSM/KG (ref 275–295)
PATIENT FASTING Y/N/NP: YES
PATIENT FASTING Y/N/NP: YES
POTASSIUM SERPL-SCNC: 3.8 MMOL/L (ref 3.5–5.1)
SODIUM SERPL-SCNC: 140 MMOL/L (ref 136–145)
T4 FREE SERPL-MCNC: 0.9 NG/DL (ref 0.8–1.7)
TRIGL SERPL-MCNC: 201 MG/DL (ref 30–149)
TSI SER-ACNC: 4.97 MIU/ML (ref 0.36–3.74)
VLDLC SERPL CALC-MCNC: 40 MG/DL (ref 0–30)

## 2021-02-03 PROCEDURE — 80053 COMPREHEN METABOLIC PANEL: CPT | Performed by: FAMILY MEDICINE

## 2021-02-03 PROCEDURE — 80061 LIPID PANEL: CPT | Performed by: FAMILY MEDICINE

## 2021-02-03 PROCEDURE — 84443 ASSAY THYROID STIM HORMONE: CPT | Performed by: FAMILY MEDICINE

## 2021-02-03 PROCEDURE — 36415 COLL VENOUS BLD VENIPUNCTURE: CPT | Performed by: FAMILY MEDICINE

## 2021-02-03 PROCEDURE — 84439 ASSAY OF FREE THYROXINE: CPT | Performed by: FAMILY MEDICINE

## 2021-02-09 ENCOUNTER — OFFICE VISIT (OUTPATIENT)
Dept: FAMILY MEDICINE CLINIC | Facility: CLINIC | Age: 66
End: 2021-02-09
Payer: COMMERCIAL

## 2021-02-09 VITALS
SYSTOLIC BLOOD PRESSURE: 110 MMHG | HEART RATE: 80 BPM | HEIGHT: 60.43 IN | BODY MASS INDEX: 29.65 KG/M2 | OXYGEN SATURATION: 98 % | TEMPERATURE: 97 F | DIASTOLIC BLOOD PRESSURE: 60 MMHG | WEIGHT: 153 LBS

## 2021-02-09 DIAGNOSIS — M25.50 PAIN IN JOINT, MULTIPLE SITES: ICD-10-CM

## 2021-02-09 DIAGNOSIS — E03.8 SUBCLINICAL HYPOTHYROIDISM: Primary | ICD-10-CM

## 2021-02-09 DIAGNOSIS — U07.1 PNEUMONIA DUE TO COVID-19 VIRUS: ICD-10-CM

## 2021-02-09 DIAGNOSIS — J12.82 PNEUMONIA DUE TO COVID-19 VIRUS: ICD-10-CM

## 2021-02-09 DIAGNOSIS — Z12.31 ENCOUNTER FOR SCREENING MAMMOGRAM FOR MALIGNANT NEOPLASM OF BREAST: ICD-10-CM

## 2021-02-09 DIAGNOSIS — Z12.11 ENCOUNTER FOR SCREENING FOR MALIGNANT NEOPLASM OF COLON: ICD-10-CM

## 2021-02-09 PROCEDURE — 99214 OFFICE O/P EST MOD 30 MIN: CPT | Performed by: FAMILY MEDICINE

## 2021-02-09 PROCEDURE — 3008F BODY MASS INDEX DOCD: CPT | Performed by: FAMILY MEDICINE

## 2021-02-09 PROCEDURE — 3074F SYST BP LT 130 MM HG: CPT | Performed by: FAMILY MEDICINE

## 2021-02-09 PROCEDURE — 3078F DIAST BP <80 MM HG: CPT | Performed by: FAMILY MEDICINE

## 2021-02-09 NOTE — PROGRESS NOTES
Milton Wade is a 76year old female here for Patient presents with:  Lab Results  Joint Pain: Ever since she had Covid in November, she is having increased joint pain      HPI:       1. Subclinical hypothyroidism  -labs mildly abnormal    2.  Pain in joint 1 tablet by mouth daily. • atorvastatin 20 MG Oral Tab Take 20 mg by mouth nightly.          Allergies:  No Known Allergies      ROS:   See HPI for relevant ROS    --GEN: No other complaints  --HEENT: No other complaints  --RESP: No other complaints  --

## 2021-02-09 NOTE — PATIENT INSTRUCTIONS
Regresa al lab en 2 meses    puede usar glucosamine o fish oil para collins huesos    Empeza generico de claritin o zyrtec cada noche  Continua spray de naris  Continua humidifier en la noche    Regresa en 6 meses, antes si necessita

## 2021-02-15 DIAGNOSIS — I10 ESSENTIAL HYPERTENSION: Primary | ICD-10-CM

## 2021-02-15 RX ORDER — TELMISARTAN 80 MG/1
80 TABLET ORAL DAILY
Qty: 90 TABLET | Refills: 0 | Status: SHIPPED | OUTPATIENT
Start: 2021-02-15 | End: 2021-07-21

## 2021-02-15 NOTE — TELEPHONE ENCOUNTER
Megan needs a refill on her Telmisartan 80 MG Oral Tab, sent to [facility] in [place], please call Neill Cabot at [phone] with any questions

## 2021-03-09 DIAGNOSIS — Z23 NEED FOR VACCINATION: ICD-10-CM

## 2021-04-19 ENCOUNTER — HOSPITAL ENCOUNTER (OUTPATIENT)
Age: 66
Discharge: HOME | End: 2021-04-19
Payer: COMMERCIAL

## 2021-04-19 VITALS
OXYGEN SATURATION: 98 % | HEIGHT: 61 IN | SYSTOLIC BLOOD PRESSURE: 151 MMHG | HEART RATE: 71 BPM | TEMPERATURE: 98 F | RESPIRATION RATE: 16 BRPM | BODY MASS INDEX: 27.75 KG/M2 | DIASTOLIC BLOOD PRESSURE: 56 MMHG | WEIGHT: 147 LBS

## 2021-04-19 DIAGNOSIS — H00.022 HORDEOLUM INTERNUM OF RIGHT LOWER EYELID: Primary | ICD-10-CM

## 2021-04-19 PROCEDURE — 99213 OFFICE O/P EST LOW 20 MIN: CPT

## 2021-04-19 RX ORDER — ERYTHROMYCIN 5 MG/G
1 OINTMENT OPHTHALMIC EVERY 6 HOURS
Qty: 1 G | Refills: 0 | Status: SHIPPED | OUTPATIENT
Start: 2021-04-19 | End: 2021-04-26

## 2021-04-19 NOTE — ED QUICK NOTES
Pt aox4. Rn instructed patient on cleansing eyes, medication prescribed, warm compresses to bilateral eyes, follow up with optho prn and return to ED with worsening s/s with patient. Pt verbalized understanding of instructions.

## 2021-04-19 NOTE — ED INITIAL ASSESSMENT (HPI)
Pt aox4. Pt c/o bump to left lower eye lid with itching and watering started 1 month ago. Pt c/o rt bump to lower eye lid started 1 week ago with itching and watering. Pt denies change in vision, and denies fever.

## 2021-04-19 NOTE — ED PROVIDER NOTES
Patient Seen in: [facility] [place]      History   No chief complaint on file. Stated Complaint: eye infection     HPI/Subjective: This is a 58-year-old female with below stated medical history.   Presents to immediate care for right lower ey Hematological: Does not bruise/bleed easily. Positive for stated complaint: eye infection   Other systems are as noted in HPI. Constitutional and vital signs reviewed. All other systems reviewed and negative except as noted above.     Physical E to person, place, and time. Psychiatric:         Mood and Affect: Mood normal.         Behavior: Behavior normal.               ED Course   Labs Reviewed - No data to display       DC home. MDM      Vital signs stable.   Obvious hordeoum internum

## 2021-04-30 ENCOUNTER — LAB ENCOUNTER (OUTPATIENT)
Dept: LAB | Age: 66
End: 2021-04-30
Attending: FAMILY MEDICINE
Payer: COMMERCIAL

## 2021-04-30 DIAGNOSIS — E03.8 SUBCLINICAL HYPOTHYROIDISM: ICD-10-CM

## 2021-04-30 PROCEDURE — 84443 ASSAY THYROID STIM HORMONE: CPT

## 2021-04-30 PROCEDURE — 36415 COLL VENOUS BLD VENIPUNCTURE: CPT

## 2021-04-30 PROCEDURE — 84439 ASSAY OF FREE THYROXINE: CPT

## 2021-05-05 ENCOUNTER — LAB ENCOUNTER (OUTPATIENT)
Dept: LAB | Age: 66
End: 2021-05-05
Attending: INTERNAL MEDICINE
Payer: COMMERCIAL

## 2021-05-05 DIAGNOSIS — Z01.818 PRE-OP TESTING: ICD-10-CM

## 2021-05-08 PROBLEM — D12.3 BENIGN NEOPLASM OF TRANSVERSE COLON: Status: ACTIVE | Noted: 2021-05-08

## 2021-05-08 PROBLEM — D12.4 BENIGN NEOPLASM OF DESCENDING COLON: Status: ACTIVE | Noted: 2021-05-08

## 2021-05-08 PROBLEM — D12.2 BENIGN NEOPLASM OF ASCENDING COLON: Status: ACTIVE | Noted: 2021-05-08

## 2021-05-08 PROBLEM — Z80.0 FAMILY HISTORY OF COLON CANCER: Status: ACTIVE | Noted: 2021-05-08

## 2021-07-21 ENCOUNTER — PATIENT MESSAGE (OUTPATIENT)
Dept: FAMILY MEDICINE CLINIC | Facility: CLINIC | Age: 66
End: 2021-07-21

## 2021-07-21 DIAGNOSIS — I10 ESSENTIAL HYPERTENSION: ICD-10-CM

## 2021-07-21 RX ORDER — ALENDRONATE SODIUM 70 MG/1
70 TABLET ORAL WEEKLY
Qty: 12 TABLET | Refills: 0 | Status: SHIPPED | OUTPATIENT
Start: 2021-07-21 | End: 2021-09-27

## 2021-07-21 RX ORDER — TELMISARTAN 80 MG/1
80 TABLET ORAL DAILY
Qty: 90 TABLET | Refills: 0 | Status: SHIPPED | OUTPATIENT
Start: 2021-07-21 | End: 2021-09-27

## 2021-07-21 RX ORDER — ATORVASTATIN CALCIUM 20 MG/1
20 TABLET, FILM COATED ORAL NIGHTLY
Qty: 90 TABLET | Refills: 0 | Status: SHIPPED | OUTPATIENT
Start: 2021-07-21 | End: 2021-09-27

## 2021-07-21 NOTE — TELEPHONE ENCOUNTER
Requested Prescriptions     Signed Prescriptions Disp Refills   • alendronate 70 MG Oral Tab 12 tablet 0     Sig: Take 1 tablet (70 mg total) by mouth once a week.      Authorizing Provider: Db Garza     Ordering User: Caryle Petties   • atorvastatin

## 2021-07-21 NOTE — TELEPHONE ENCOUNTER
From: Dot Kiser  To: Abril Sands MD  Sent: 7/21/2021 10:25 AM CDT  Subject: Prescription Question    Please help and refill two prescription  Atorvastatin and Alendronate .

## 2021-07-21 NOTE — TELEPHONE ENCOUNTER
Requested Prescriptions     Signed Prescriptions Disp Refills   • Telmisartan 80 MG Oral Tab 90 tablet 0     Sig: Take 1 tablet (80 mg total) by mouth daily. Authorizing Provider: Eugenio Parker     Ordering User: Domonique Day     Met protocol.  Refil

## 2021-09-27 DIAGNOSIS — I10 ESSENTIAL HYPERTENSION: ICD-10-CM

## 2021-09-27 RX ORDER — TELMISARTAN 80 MG/1
80 TABLET ORAL DAILY
Qty: 30 TABLET | Refills: 0 | Status: SHIPPED | OUTPATIENT
Start: 2021-09-27

## 2021-09-27 RX ORDER — ATORVASTATIN CALCIUM 20 MG/1
20 TABLET, FILM COATED ORAL NIGHTLY
Qty: 30 TABLET | Refills: 0 | Status: SHIPPED | OUTPATIENT
Start: 2021-09-27

## 2021-09-27 RX ORDER — ALENDRONATE SODIUM 70 MG/1
70 TABLET ORAL WEEKLY
Qty: 4 TABLET | Refills: 0 | Status: SHIPPED | OUTPATIENT
Start: 2021-09-27

## 2021-09-27 NOTE — TELEPHONE ENCOUNTER
atorvastatin 20 MG Oral Tab 90 tablet 0 7/21/2021    Sig:   Take 1 tablet (20 mg total) by mouth nightly.        LOV 2/9/21    Follow up 6 months

## 2021-10-25 ENCOUNTER — TELEPHONE (OUTPATIENT)
Dept: FAMILY MEDICINE CLINIC | Facility: CLINIC | Age: 66
End: 2021-10-25

## 2022-01-27 ENCOUNTER — HOSPITAL ENCOUNTER (OUTPATIENT)
Age: 67
Discharge: HOME | End: 2022-01-27
Payer: MEDICARE

## 2022-01-27 VITALS
WEIGHT: 150 LBS | DIASTOLIC BLOOD PRESSURE: 62 MMHG | SYSTOLIC BLOOD PRESSURE: 157 MMHG | TEMPERATURE: 98 F | OXYGEN SATURATION: 98 % | HEART RATE: 82 BPM | RESPIRATION RATE: 16 BRPM | BODY MASS INDEX: 28 KG/M2

## 2022-01-27 DIAGNOSIS — H00.015 HORDEOLUM EXTERNUM OF LEFT LOWER EYELID: Primary | ICD-10-CM

## 2022-01-27 PROCEDURE — 99213 OFFICE O/P EST LOW 20 MIN: CPT

## 2022-01-27 RX ORDER — CEFDINIR 300 MG/1
300 CAPSULE ORAL 2 TIMES DAILY
Qty: 14 CAPSULE | Refills: 0 | Status: SHIPPED | OUTPATIENT
Start: 2022-01-27 | End: 2022-02-03

## 2022-01-28 NOTE — ED PROVIDER NOTES
Patient Seen in: [facility] [place]      History   Patient presents with:  Eye Problem    Stated Complaint: eye problems     Subjective:   HPI  58-year-old female with a history of a stye to her left lower eyelid 1 year ago presents to the [facility] Chart Acuity: 20/25, Corrected    Physical Exam  Vitals and nursing note reviewed. Constitutional:       Appearance: She is well-developed. Eyes:      Extraocular Movements: Extraocular movements intact.       Conjunctiva/sclera: Conjunctivae normal. Oral Cap  Take 1 capsule (300 mg total) by mouth 2 (two) times daily for 7 days. , Normal, Disp-14 capsule, R-0

## 2022-01-28 NOTE — ED INITIAL ASSESSMENT (HPI)
C/o both eyelid swelling left worse right for 7 days, possible stye. Started Systane, Pataday and Erythromycin yesterday-no improvement.

## 2022-02-04 ENCOUNTER — TELEPHONE (OUTPATIENT)
Dept: FAMILY MEDICINE CLINIC | Facility: CLINIC | Age: 67
End: 2022-02-04

## 2022-02-04 NOTE — TELEPHONE ENCOUNTER
Received signed medical records request/authorization form for Michelle Flynn to disclose patient health information to the Facility identified below:     Facility / Provider Name: [facility] Phone: [address] Fax: [phone]    Specific PHI to be disclosed: Any/all    Medical records timeframe: All records    Medical Records release form has been sent to [employer] for processing. MR Authorization form has also been sent to scanning.      Tag # [id number]

## 2022-05-14 ENCOUNTER — HOSPITAL ENCOUNTER (OUTPATIENT)
Age: 67
Discharge: HOME | End: 2022-05-14
Payer: MEDICARE

## 2022-05-14 ENCOUNTER — APPOINTMENT (OUTPATIENT)
Dept: GENERAL RADIOLOGY | Age: 67
End: 2022-05-14
Attending: NURSE PRACTITIONER
Payer: MEDICARE

## 2022-05-14 VITALS
HEART RATE: 82 BPM | OXYGEN SATURATION: 96 % | SYSTOLIC BLOOD PRESSURE: 135 MMHG | TEMPERATURE: 98 F | RESPIRATION RATE: 20 BRPM | DIASTOLIC BLOOD PRESSURE: 65 MMHG | BODY MASS INDEX: 28.89 KG/M2 | HEIGHT: 61 IN | WEIGHT: 153 LBS

## 2022-05-14 DIAGNOSIS — J40 BRONCHITIS: Primary | ICD-10-CM

## 2022-05-14 LAB — SARS-COV-2 RNA RESP QL NAA+PROBE: NOT DETECTED

## 2022-05-14 PROCEDURE — 99213 OFFICE O/P EST LOW 20 MIN: CPT

## 2022-05-14 PROCEDURE — 99214 OFFICE O/P EST MOD 30 MIN: CPT

## 2022-05-14 PROCEDURE — 71046 X-RAY EXAM CHEST 2 VIEWS: CPT | Performed by: NURSE PRACTITIONER

## 2022-05-14 RX ORDER — AZITHROMYCIN 250 MG/1
TABLET, FILM COATED ORAL
Qty: 6 TABLET | Refills: 0 | Status: SHIPPED | OUTPATIENT
Start: 2022-05-14 | End: 2022-05-19

## 2022-05-14 RX ORDER — ALBUTEROL SULFATE 90 UG/1
2 AEROSOL, METERED RESPIRATORY (INHALATION) EVERY 4 HOURS PRN
Qty: 1 EACH | Refills: 0 | Status: SHIPPED | OUTPATIENT
Start: 2022-05-14 | End: 2022-06-13

## 2022-07-20 ENCOUNTER — APPOINTMENT (OUTPATIENT)
Dept: GENERAL RADIOLOGY | Facility: HOSPITAL | Age: 67
End: 2022-07-20
Attending: EMERGENCY MEDICINE
Payer: MEDICARE

## 2022-07-20 ENCOUNTER — HOSPITAL ENCOUNTER (EMERGENCY)
Facility: HOSPITAL | Age: 67
Discharge: HOME | End: 2022-07-20
Attending: EMERGENCY MEDICINE
Payer: MEDICARE

## 2022-07-20 VITALS
SYSTOLIC BLOOD PRESSURE: 138 MMHG | HEART RATE: 55 BPM | RESPIRATION RATE: 16 BRPM | TEMPERATURE: 97 F | OXYGEN SATURATION: 97 % | DIASTOLIC BLOOD PRESSURE: 58 MMHG

## 2022-07-20 DIAGNOSIS — J18.9 COMMUNITY ACQUIRED PNEUMONIA OF RIGHT LOWER LOBE OF LUNG: Primary | ICD-10-CM

## 2022-07-20 LAB
ALBUMIN SERPL-MCNC: 3.7 G/DL (ref 3.4–5)
ALBUMIN/GLOB SERPL: 1 {RATIO} (ref 1–2)
ALP LIVER SERPL-CCNC: 58 U/L
ALT SERPL-CCNC: 45 U/L
ANION GAP SERPL CALC-SCNC: 7 MMOL/L (ref 0–18)
AST SERPL-CCNC: 27 U/L (ref 15–37)
ATRIAL RATE: 66 BPM
BASOPHILS # BLD AUTO: 0.03 X10(3) UL (ref 0–0.2)
BASOPHILS NFR BLD AUTO: 0.6 %
BILIRUB SERPL-MCNC: 0.4 MG/DL (ref 0.1–2)
BILIRUB UR QL STRIP.AUTO: NEGATIVE
BUN BLD-MCNC: 11 MG/DL (ref 7–18)
CALCIUM BLD-MCNC: 9 MG/DL (ref 8.5–10.1)
CHLORIDE SERPL-SCNC: 107 MMOL/L (ref 98–112)
CLARITY UR REFRACT.AUTO: CLEAR
CO2 SERPL-SCNC: 25 MMOL/L (ref 21–32)
COLOR UR AUTO: YELLOW
CREAT BLD-MCNC: 0.81 MG/DL
D DIMER PPP FEU-MCNC: <0.27 UG/ML FEU (ref ?–0.68)
EOSINOPHIL # BLD AUTO: 0.11 X10(3) UL (ref 0–0.7)
EOSINOPHIL NFR BLD AUTO: 2.3 %
ERYTHROCYTE [DISTWIDTH] IN BLOOD BY AUTOMATED COUNT: 12.2 %
GLOBULIN PLAS-MCNC: 3.6 G/DL (ref 2.8–4.4)
GLUCOSE BLD-MCNC: 114 MG/DL (ref 70–99)
GLUCOSE UR STRIP.AUTO-MCNC: NEGATIVE MG/DL
HCT VFR BLD AUTO: 38.2 %
HGB BLD-MCNC: 13.1 G/DL
IMM GRANULOCYTES # BLD AUTO: 0 X10(3) UL (ref 0–1)
IMM GRANULOCYTES NFR BLD: 0 %
KETONES UR STRIP.AUTO-MCNC: NEGATIVE MG/DL
LIPASE SERPL-CCNC: 135 U/L (ref 73–393)
LYMPHOCYTES # BLD AUTO: 1.96 X10(3) UL (ref 1–4)
LYMPHOCYTES NFR BLD AUTO: 40.7 %
MCH RBC QN AUTO: 31.6 PG (ref 26–34)
MCHC RBC AUTO-ENTMCNC: 34.3 G/DL (ref 31–37)
MCV RBC AUTO: 92.3 FL
MONOCYTES # BLD AUTO: 0.42 X10(3) UL (ref 0.1–1)
MONOCYTES NFR BLD AUTO: 8.7 %
NEUTROPHILS # BLD AUTO: 2.3 X10 (3) UL (ref 1.5–7.7)
NEUTROPHILS # BLD AUTO: 2.3 X10(3) UL (ref 1.5–7.7)
NEUTROPHILS NFR BLD AUTO: 47.7 %
NITRITE UR QL STRIP.AUTO: NEGATIVE
OSMOLALITY SERPL CALC.SUM OF ELEC: 288 MOSM/KG (ref 275–295)
P AXIS: 12 DEGREES
P-R INTERVAL: 172 MS
PH UR STRIP.AUTO: 7 [PH] (ref 5–8)
PLATELET # BLD AUTO: 240 10(3)UL (ref 150–450)
POTASSIUM SERPL-SCNC: 3.7 MMOL/L (ref 3.5–5.1)
PROT SERPL-MCNC: 7.3 G/DL (ref 6.4–8.2)
PROT UR STRIP.AUTO-MCNC: NEGATIVE MG/DL
Q-T INTERVAL: 422 MS
QRS DURATION: 76 MS
QTC CALCULATION (BEZET): 442 MS
R AXIS: 14 DEGREES
RBC # BLD AUTO: 4.14 X10(6)UL
RBC UR QL AUTO: NEGATIVE
SODIUM SERPL-SCNC: 139 MMOL/L (ref 136–145)
SP GR UR STRIP.AUTO: 1.01 (ref 1–1.03)
T AXIS: 22 DEGREES
TROPONIN I HIGH SENSITIVITY: 49 NG/L
UROBILINOGEN UR STRIP.AUTO-MCNC: <2 MG/DL
VENTRICULAR RATE: 66 BPM
WBC # BLD AUTO: 4.8 X10(3) UL (ref 4–11)

## 2022-07-20 PROCEDURE — 84484 ASSAY OF TROPONIN QUANT: CPT | Performed by: EMERGENCY MEDICINE

## 2022-07-20 PROCEDURE — 93010 ELECTROCARDIOGRAM REPORT: CPT

## 2022-07-20 PROCEDURE — 83690 ASSAY OF LIPASE: CPT | Performed by: EMERGENCY MEDICINE

## 2022-07-20 PROCEDURE — 87086 URINE CULTURE/COLONY COUNT: CPT | Performed by: EMERGENCY MEDICINE

## 2022-07-20 PROCEDURE — 96374 THER/PROPH/DIAG INJ IV PUSH: CPT

## 2022-07-20 PROCEDURE — 99285 EMERGENCY DEPT VISIT HI MDM: CPT

## 2022-07-20 PROCEDURE — 96361 HYDRATE IV INFUSION ADD-ON: CPT

## 2022-07-20 PROCEDURE — 85025 COMPLETE CBC W/AUTO DIFF WBC: CPT | Performed by: EMERGENCY MEDICINE

## 2022-07-20 PROCEDURE — 71045 X-RAY EXAM CHEST 1 VIEW: CPT | Performed by: EMERGENCY MEDICINE

## 2022-07-20 PROCEDURE — 81001 URINALYSIS AUTO W/SCOPE: CPT | Performed by: EMERGENCY MEDICINE

## 2022-07-20 PROCEDURE — 93005 ELECTROCARDIOGRAM TRACING: CPT

## 2022-07-20 PROCEDURE — 99284 EMERGENCY DEPT VISIT MOD MDM: CPT

## 2022-07-20 PROCEDURE — 80053 COMPREHEN METABOLIC PANEL: CPT | Performed by: EMERGENCY MEDICINE

## 2022-07-20 PROCEDURE — 85379 FIBRIN DEGRADATION QUANT: CPT | Performed by: EMERGENCY MEDICINE

## 2022-07-20 RX ORDER — AMOXICILLIN AND CLAVULANATE POTASSIUM 875; 125 MG/1; MG/1
875 TABLET, FILM COATED ORAL ONCE
Status: COMPLETED | OUTPATIENT
Start: 2022-07-20 | End: 2022-07-20

## 2022-07-20 RX ORDER — AMOXICILLIN AND CLAVULANATE POTASSIUM 875; 125 MG/1; MG/1
1 TABLET, FILM COATED ORAL 2 TIMES DAILY
Qty: 14 TABLET | Refills: 0 | Status: SHIPPED | OUTPATIENT
Start: 2022-07-20 | End: 2022-07-27

## 2022-07-20 RX ORDER — KETOROLAC TROMETHAMINE 15 MG/ML
15 INJECTION, SOLUTION INTRAMUSCULAR; INTRAVENOUS ONCE
Status: COMPLETED | OUTPATIENT
Start: 2022-07-20 | End: 2022-07-20

## 2022-07-20 RX ORDER — METRONIDAZOLE 500 MG/1
500 TABLET ORAL 3 TIMES DAILY
COMMUNITY

## 2022-07-20 NOTE — ED INITIAL ASSESSMENT (HPI)
Patient to ED with c/o right thoracic back pain that intermittently radiates to RUQ. Pain began Saturday. No n/v/d or fevers.

## 2022-08-02 ENCOUNTER — APPOINTMENT (OUTPATIENT)
Dept: CT IMAGING | Age: 67
End: 2022-08-02
Attending: EMERGENCY MEDICINE
Payer: MEDICARE

## 2022-08-02 ENCOUNTER — HOSPITAL ENCOUNTER (OUTPATIENT)
Age: 67
Discharge: HOME | End: 2022-08-02
Attending: EMERGENCY MEDICINE
Payer: MEDICARE

## 2022-08-02 VITALS
TEMPERATURE: 98 F | OXYGEN SATURATION: 97 % | DIASTOLIC BLOOD PRESSURE: 58 MMHG | RESPIRATION RATE: 16 BRPM | HEART RATE: 57 BPM | SYSTOLIC BLOOD PRESSURE: 132 MMHG

## 2022-08-02 DIAGNOSIS — M54.9 MODERATE BACK PAIN: Primary | ICD-10-CM

## 2022-08-02 PROCEDURE — 71275 CT ANGIOGRAPHY CHEST: CPT | Performed by: EMERGENCY MEDICINE

## 2022-08-02 PROCEDURE — 99215 OFFICE O/P EST HI 40 MIN: CPT

## 2022-08-02 PROCEDURE — 99214 OFFICE O/P EST MOD 30 MIN: CPT

## 2022-08-02 PROCEDURE — 96374 THER/PROPH/DIAG INJ IV PUSH: CPT

## 2022-08-02 RX ORDER — KETOROLAC TROMETHAMINE 30 MG/ML
30 INJECTION, SOLUTION INTRAMUSCULAR; INTRAVENOUS ONCE
Status: COMPLETED | OUTPATIENT
Start: 2022-08-02 | End: 2022-08-02

## 2022-08-02 RX ORDER — LEVOFLOXACIN 500 MG/1
500 TABLET, FILM COATED ORAL DAILY
Qty: 10 TABLET | Refills: 0 | Status: SHIPPED | OUTPATIENT
Start: 2022-08-02 | End: 2022-08-12

## 2022-08-02 RX ORDER — HYDROCODONE BITARTRATE AND ACETAMINOPHEN 5; 325 MG/1; MG/1
1-2 TABLET ORAL EVERY 6 HOURS PRN
Qty: 10 TABLET | Refills: 0 | Status: SHIPPED | OUTPATIENT
Start: 2022-08-02 | End: 2022-08-07

## 2022-08-02 NOTE — ED INITIAL ASSESSMENT (HPI)
Right upper back pain radiating into right side of chest. Seen at emergency department on 7/20 and had seen primary care doctor. Prescribed and finished course of antibiotics, still taking prednisone without relief.

## 2022-09-09 ENCOUNTER — TELEPHONE (OUTPATIENT)
Dept: FAMILY MEDICINE CLINIC | Facility: CLINIC | Age: 67
End: 2022-09-09

## 2022-09-09 NOTE — TELEPHONE ENCOUNTER
Called jeremiah to schedule her medicare annual wellness. Patient's  informed that  They see a dr at [facility] and have a new dr there.  PCP change or registry removal request submitted

## 2022-10-15 ENCOUNTER — APPOINTMENT (OUTPATIENT)
Dept: GENERAL RADIOLOGY | Age: 67
End: 2022-10-15
Attending: NURSE PRACTITIONER
Payer: MEDICARE

## 2022-10-15 ENCOUNTER — HOSPITAL ENCOUNTER (OUTPATIENT)
Age: 67
Discharge: HOME | End: 2022-10-15
Payer: MEDICARE

## 2022-10-15 VITALS
HEART RATE: 105 BPM | DIASTOLIC BLOOD PRESSURE: 56 MMHG | TEMPERATURE: 101 F | OXYGEN SATURATION: 96 % | SYSTOLIC BLOOD PRESSURE: 128 MMHG | RESPIRATION RATE: 20 BRPM

## 2022-10-15 DIAGNOSIS — J02.0 STREP PHARYNGITIS: Primary | ICD-10-CM

## 2022-10-15 DIAGNOSIS — R05.1 ACUTE COUGH: ICD-10-CM

## 2022-10-15 LAB
S PYO AG THROAT QL: POSITIVE
SARS-COV-2 RNA RESP QL NAA+PROBE: NOT DETECTED

## 2022-10-15 PROCEDURE — 99214 OFFICE O/P EST MOD 30 MIN: CPT

## 2022-10-15 PROCEDURE — 87880 STREP A ASSAY W/OPTIC: CPT

## 2022-10-15 PROCEDURE — 71046 X-RAY EXAM CHEST 2 VIEWS: CPT | Performed by: NURSE PRACTITIONER

## 2022-10-15 RX ORDER — BENZONATATE 100 MG/1
100 CAPSULE ORAL 3 TIMES DAILY PRN
Qty: 30 CAPSULE | Refills: 0 | Status: SHIPPED | OUTPATIENT
Start: 2022-10-15 | End: 2022-11-14

## 2022-10-15 RX ORDER — ACETAMINOPHEN 325 MG/1
650 TABLET ORAL ONCE
Status: COMPLETED | OUTPATIENT
Start: 2022-10-15 | End: 2022-10-15

## 2022-10-15 RX ORDER — AMOXICILLIN 875 MG/1
875 TABLET, COATED ORAL 2 TIMES DAILY
Qty: 20 TABLET | Refills: 0 | Status: SHIPPED | OUTPATIENT
Start: 2022-10-15 | End: 2022-10-25

## 2022-10-24 ENCOUNTER — PATIENT OUTREACH (OUTPATIENT)
Dept: FAMILY MEDICINE CLINIC | Facility: CLINIC | Age: 67
End: 2022-10-24

## 2022-10-24 ENCOUNTER — TELEPHONE (OUTPATIENT)
Dept: FAMILY MEDICINE CLINIC | Facility: CLINIC | Age: 67
End: 2022-10-24

## 2022-10-24 NOTE — PROGRESS NOTES
Called pt to schedule annual medicare wellness exam on 10/24/22.   states she is no longer a patient of Dr. [facility]ily Reilly [facility] Minnie Anglin a PCP removal

## 2022-10-24 NOTE — TELEPHONE ENCOUNTER
Called pt to schedule annual medicare wellness exam on 10/24/22.   states she is no longer a patient of Dr. Zully Mena [facility] Ree Araujo a PCP removal

## 2024-09-03 PROBLEM — M85.80 OSTEOPENIA: Status: ACTIVE | Noted: 2024-09-03

## 2024-09-03 NOTE — H&P
Subjective:   Megan Dwyre is a 69 year old female  who presents for Establish Care     New patient establishing care     HLD-on statin     HTN-not at goal today. Denies cp/sob/angina.     Osteopenia seen in 2019. Was on alendronate for 11 years per pt report. But has not been on it since ~2021.    Colon polyps and family hx of colon cancer- due for colonoscopy.   Needed additional breast imaging but did not complete - due for diagnostic mammogram.     Acid reflux- mild. But does have symptoms.   L sided sciatica at night.     Remainder of ROS negative.     History/Other:    Chief Complaint Reviewed and Verified  No Further Nursing Notes to   Review  Tobacco Reviewed  Allergies Reviewed  Medications Reviewed    Medical History Reviewed  Surgical History Reviewed  OB Status Reviewed    Family History Reviewed  Social History Reviewed         Current Outpatient Medications   Medication Sig Dispense Refill    famotidine 20 MG Oral Tab Take 1 tablet (20 mg total) by mouth 2 (two) times daily. 180 tablet 1    atorvastatin 20 MG Oral Tab Take 1 tablet (20 mg total) by mouth nightly. 30 tablet 0    Telmisartan 80 MG Oral Tab Take 1 tablet (80 mg total) by mouth daily. 30 tablet 0    Ergocalciferol (VITAMIN D OR) Take 1 tablet by mouth daily.      Ascorbic Acid (VITAMIN C OR) Take 1 tablet by mouth daily.         Review of Systems:  Pertinent items are noted in HPI.  A comprehensive 10 point review of systems was completed.  Pertinent positives and negatives noted in the the HPI.        Objective:   /78   Pulse 75   Temp 97 °F (36.1 °C) (Temporal)   Resp 15   Ht 5' 0.5\" (1.537 m)   Wt 152 lb 12.8 oz (69.3 kg)   SpO2 98%   BMI 29.35 kg/m²  Estimated body mass index is 29.35 kg/m² as calculated from the following:    Height as of this encounter: 5' 0.5\" (1.537 m).    Weight as of this encounter: 152 lb 12.8 oz (69.3 kg).  PHYSICAL EXAM:   General: no acute distress   Respiratory: no increased work of  breathing; good air exchange; CTAB; no crackles or wheezing   Cardiovascular: RRR; S1, S2; no murmurs;no edema   Gastrointestinal: normal bowel sounds; soft; non-distended; non-tender  Neurological: awake, alert, oriented x3; CNII-XII grossly intact  Behavioral/Psych: euthymic; appropriate affect       Assessment & Plan:   Megan was seen today for establish care.    Diagnoses and all orders for this visit:    Hypertension, unspecified type  -     Vitamin D; Future  -     CBC With Differential With Platelet; Future  -     Comp Metabolic Panel (14); Future  -     Hemoglobin A1C; Future  -     TSH W Reflex To Free T4; Future  -     Lipid Panel; Future  -telmisartan     Hyperlipidemia, unspecified hyperlipidemia type  -     Vitamin D; Future  -     CBC With Differential With Platelet; Future  -     Comp Metabolic Panel (14); Future  -     Hemoglobin A1C; Future  -     TSH W Reflex To Free T4; Future  -     Lipid Panel; Future  -atorvastatin     Osteopenia, unspecified location  -     XR DEXA BONE DENSITOMETRY (CPT=77080); Future  -     Vitamin D; Future  -     CBC With Differential With Platelet; Future  -     Comp Metabolic Panel (14); Future  -     Hemoglobin A1C; Future  -     TSH W Reflex To Free T4; Future  -     Lipid Panel; Future    History of colon polyps  -     Gastro Referral - In Network    Family history of colon cancer  -     Gastro Referral - In Network    Abnormal mammogram  Comments:  under care everywhere  Orders:  -     VERONICA MARTÍN 2D+3D DIAGNOSTIC VERONICA  BILAT (CPT=77066/25487); Future    Postmenopausal  -     XR DEXA BONE DENSITOMETRY (CPT=77080); Future      Gastroesophageal reflux disease, unspecified whether esophagitis present  -     famotidine 20 MG Oral Tab; Take 1 tablet (20 mg total) by mouth 2 (two) times daily.    Left sided sciatica  -     Physical Therapy Referral - [place] Location                Jane Turner MD

## 2024-09-04 ENCOUNTER — OFFICE VISIT (OUTPATIENT)
Dept: INTERNAL MEDICINE CLINIC | Facility: CLINIC | Age: 69
End: 2024-09-04
Payer: MEDICARE

## 2024-09-04 VITALS
TEMPERATURE: 97 F | HEIGHT: 60.5 IN | OXYGEN SATURATION: 98 % | RESPIRATION RATE: 15 BRPM | DIASTOLIC BLOOD PRESSURE: 78 MMHG | HEART RATE: 75 BPM | WEIGHT: 152.81 LBS | BODY MASS INDEX: 29.22 KG/M2 | SYSTOLIC BLOOD PRESSURE: 134 MMHG

## 2024-09-04 DIAGNOSIS — R79.9 ABNORMAL FINDING OF BLOOD CHEMISTRY, UNSPECIFIED: ICD-10-CM

## 2024-09-04 DIAGNOSIS — Z78.0 POSTMENOPAUSAL: ICD-10-CM

## 2024-09-04 DIAGNOSIS — E78.5 HYPERLIPIDEMIA, UNSPECIFIED HYPERLIPIDEMIA TYPE: ICD-10-CM

## 2024-09-04 DIAGNOSIS — I10 HYPERTENSION, UNSPECIFIED TYPE: Primary | ICD-10-CM

## 2024-09-04 DIAGNOSIS — M85.80 OSTEOPENIA, UNSPECIFIED LOCATION: ICD-10-CM

## 2024-09-04 DIAGNOSIS — K21.9 GASTROESOPHAGEAL REFLUX DISEASE, UNSPECIFIED WHETHER ESOPHAGITIS PRESENT: ICD-10-CM

## 2024-09-04 DIAGNOSIS — M54.32 LEFT SIDED SCIATICA: ICD-10-CM

## 2024-09-04 DIAGNOSIS — Z86.010 HISTORY OF COLON POLYPS: ICD-10-CM

## 2024-09-04 DIAGNOSIS — Z80.0 FAMILY HISTORY OF COLON CANCER: ICD-10-CM

## 2024-09-04 DIAGNOSIS — R92.8 ABNORMAL MAMMOGRAM: ICD-10-CM

## 2024-09-04 PROBLEM — R09.02 HYPOXIA: Status: RESOLVED | Noted: 2020-11-09 | Resolved: 2024-09-04

## 2024-09-04 PROBLEM — R50.9 FEVER, UNSPECIFIED FEVER CAUSE: Status: RESOLVED | Noted: 2020-07-02 | Resolved: 2024-09-04

## 2024-09-04 PROBLEM — E87.1 HYPONATREMIA: Status: RESOLVED | Noted: 2020-07-02 | Resolved: 2024-09-04

## 2024-09-04 PROBLEM — E87.6 HYPOKALEMIA: Status: RESOLVED | Noted: 2020-07-02 | Resolved: 2024-09-04

## 2024-09-04 PROBLEM — D12.4 BENIGN NEOPLASM OF DESCENDING COLON: Status: RESOLVED | Noted: 2021-05-08 | Resolved: 2024-09-04

## 2024-09-04 PROBLEM — R50.9 FEVER AND CHILLS: Status: RESOLVED | Noted: 2020-07-02 | Resolved: 2024-09-04

## 2024-09-04 PROBLEM — D12.3 BENIGN NEOPLASM OF TRANSVERSE COLON: Status: RESOLVED | Noted: 2021-05-08 | Resolved: 2024-09-04

## 2024-09-04 PROBLEM — D12.2 BENIGN NEOPLASM OF ASCENDING COLON: Status: RESOLVED | Noted: 2021-05-08 | Resolved: 2024-09-04

## 2024-09-04 PROBLEM — Z86.0100 HISTORY OF COLON POLYPS: Status: ACTIVE | Noted: 2024-09-04

## 2024-09-04 PROBLEM — U07.1 PNEUMONIA DUE TO COVID-19 VIRUS: Status: RESOLVED | Noted: 2020-11-09 | Resolved: 2024-09-04

## 2024-09-04 PROBLEM — R10.9 ABDOMINAL PAIN OF UNKNOWN ETIOLOGY: Status: RESOLVED | Noted: 2020-07-02 | Resolved: 2024-09-04

## 2024-09-04 PROBLEM — J12.82 PNEUMONIA DUE TO COVID-19 VIRUS: Status: RESOLVED | Noted: 2020-11-09 | Resolved: 2024-09-04

## 2024-09-04 PROBLEM — R73.9 HYPERGLYCEMIA: Status: RESOLVED | Noted: 2020-07-02 | Resolved: 2024-09-04

## 2024-09-04 PROCEDURE — 3075F SYST BP GE 130 - 139MM HG: CPT | Performed by: INTERNAL MEDICINE

## 2024-09-04 PROCEDURE — 99204 OFFICE O/P NEW MOD 45 MIN: CPT | Performed by: INTERNAL MEDICINE

## 2024-09-04 PROCEDURE — 1159F MED LIST DOCD IN RCRD: CPT | Performed by: INTERNAL MEDICINE

## 2024-09-04 PROCEDURE — 3008F BODY MASS INDEX DOCD: CPT | Performed by: INTERNAL MEDICINE

## 2024-09-04 PROCEDURE — 1170F FXNL STATUS ASSESSED: CPT | Performed by: INTERNAL MEDICINE

## 2024-09-04 PROCEDURE — 1160F RVW MEDS BY RX/DR IN RCRD: CPT | Performed by: INTERNAL MEDICINE

## 2024-09-04 PROCEDURE — 3078F DIAST BP <80 MM HG: CPT | Performed by: INTERNAL MEDICINE

## 2024-09-04 RX ORDER — FAMOTIDINE 20 MG/1
20 TABLET, FILM COATED ORAL 2 TIMES DAILY
Qty: 180 TABLET | Refills: 1 | Status: SHIPPED | OUTPATIENT
Start: 2024-09-04

## 2024-09-13 ENCOUNTER — LAB ENCOUNTER (OUTPATIENT)
Dept: LAB | Age: 69
End: 2024-09-13
Attending: INTERNAL MEDICINE
Payer: MEDICARE

## 2024-09-13 DIAGNOSIS — E78.5 HYPERLIPIDEMIA, UNSPECIFIED HYPERLIPIDEMIA TYPE: ICD-10-CM

## 2024-09-13 DIAGNOSIS — R79.9 ABNORMAL FINDING OF BLOOD CHEMISTRY, UNSPECIFIED: ICD-10-CM

## 2024-09-13 DIAGNOSIS — M85.80 OSTEOPENIA, UNSPECIFIED LOCATION: ICD-10-CM

## 2024-09-13 DIAGNOSIS — I10 HYPERTENSION, UNSPECIFIED TYPE: ICD-10-CM

## 2024-09-13 LAB
ALBUMIN SERPL-MCNC: 4.6 G/DL (ref 3.2–4.8)
ALBUMIN/GLOB SERPL: 1.6 {RATIO} (ref 1–2)
ALP LIVER SERPL-CCNC: 70 U/L
ALT SERPL-CCNC: 33 U/L
ANION GAP SERPL CALC-SCNC: 7 MMOL/L (ref 0–18)
AST SERPL-CCNC: 20 U/L (ref ?–34)
BASOPHILS # BLD AUTO: 0.02 X10(3) UL (ref 0–0.2)
BASOPHILS NFR BLD AUTO: 0.4 %
BILIRUB SERPL-MCNC: 0.7 MG/DL (ref 0.2–1.1)
BUN BLD-MCNC: 12 MG/DL (ref 9–23)
CALCIUM BLD-MCNC: 10 MG/DL (ref 8.7–10.4)
CHLORIDE SERPL-SCNC: 106 MMOL/L (ref 98–112)
CHOLEST SERPL-MCNC: 190 MG/DL (ref ?–200)
CO2 SERPL-SCNC: 28 MMOL/L (ref 21–32)
CREAT BLD-MCNC: 0.87 MG/DL
EGFRCR SERPLBLD CKD-EPI 2021: 72 ML/MIN/1.73M2 (ref 60–?)
EOSINOPHIL # BLD AUTO: 0.07 X10(3) UL (ref 0–0.7)
EOSINOPHIL NFR BLD AUTO: 1.4 %
ERYTHROCYTE [DISTWIDTH] IN BLOOD BY AUTOMATED COUNT: 12.5 %
EST. AVERAGE GLUCOSE BLD GHB EST-MCNC: 114 MG/DL (ref 68–126)
FASTING PATIENT LIPID ANSWER: YES
FASTING STATUS PATIENT QL REPORTED: YES
GLOBULIN PLAS-MCNC: 2.9 G/DL (ref 2–3.5)
GLUCOSE BLD-MCNC: 99 MG/DL (ref 70–99)
HBA1C MFR BLD: 5.6 % (ref ?–5.7)
HCT VFR BLD AUTO: 40.2 %
HDLC SERPL-MCNC: 46 MG/DL (ref 40–59)
HGB BLD-MCNC: 13.7 G/DL
IMM GRANULOCYTES # BLD AUTO: 0.01 X10(3) UL (ref 0–1)
IMM GRANULOCYTES NFR BLD: 0.2 %
LDLC SERPL CALC-MCNC: 112 MG/DL (ref ?–100)
LYMPHOCYTES # BLD AUTO: 2.13 X10(3) UL (ref 1–4)
LYMPHOCYTES NFR BLD AUTO: 42.6 %
MCH RBC QN AUTO: 31.4 PG (ref 26–34)
MCHC RBC AUTO-ENTMCNC: 34.1 G/DL (ref 31–37)
MCV RBC AUTO: 92.2 FL
MONOCYTES # BLD AUTO: 0.32 X10(3) UL (ref 0.1–1)
MONOCYTES NFR BLD AUTO: 6.4 %
NEUTROPHILS # BLD AUTO: 2.45 X10 (3) UL (ref 1.5–7.7)
NEUTROPHILS # BLD AUTO: 2.45 X10(3) UL (ref 1.5–7.7)
NEUTROPHILS NFR BLD AUTO: 49 %
NONHDLC SERPL-MCNC: 144 MG/DL (ref ?–130)
OSMOLALITY SERPL CALC.SUM OF ELEC: 292 MOSM/KG (ref 275–295)
PLATELET # BLD AUTO: 214 10(3)UL (ref 150–450)
POTASSIUM SERPL-SCNC: 4.2 MMOL/L (ref 3.5–5.1)
PROT SERPL-MCNC: 7.5 G/DL (ref 5.7–8.2)
RBC # BLD AUTO: 4.36 X10(6)UL
SODIUM SERPL-SCNC: 141 MMOL/L (ref 136–145)
TRIGL SERPL-MCNC: 182 MG/DL (ref 30–149)
TSI SER-ACNC: 3.69 MIU/ML (ref 0.55–4.78)
VIT D+METAB SERPL-MCNC: 47.8 NG/ML (ref 30–100)
VLDLC SERPL CALC-MCNC: 31 MG/DL (ref 0–30)
WBC # BLD AUTO: 5 X10(3) UL (ref 4–11)

## 2024-09-13 PROCEDURE — 82306 VITAMIN D 25 HYDROXY: CPT

## 2024-09-13 PROCEDURE — 84443 ASSAY THYROID STIM HORMONE: CPT

## 2024-09-13 PROCEDURE — 83036 HEMOGLOBIN GLYCOSYLATED A1C: CPT

## 2024-09-13 PROCEDURE — 85025 COMPLETE CBC W/AUTO DIFF WBC: CPT

## 2024-09-13 PROCEDURE — 80061 LIPID PANEL: CPT

## 2024-09-13 PROCEDURE — 80053 COMPREHEN METABOLIC PANEL: CPT

## 2024-09-13 PROCEDURE — 36415 COLL VENOUS BLD VENIPUNCTURE: CPT

## 2024-09-24 ENCOUNTER — HOSPITAL ENCOUNTER (OUTPATIENT)
Dept: BONE DENSITY | Age: 69
Discharge: HOME | End: 2024-09-24
Attending: INTERNAL MEDICINE
Payer: MEDICARE

## 2024-09-24 ENCOUNTER — HOSPITAL ENCOUNTER (OUTPATIENT)
Dept: MAMMOGRAPHY | Age: 69
Discharge: HOME | End: 2024-09-24
Attending: INTERNAL MEDICINE
Payer: MEDICARE

## 2024-09-24 DIAGNOSIS — M85.80 OSTEOPENIA, UNSPECIFIED LOCATION: ICD-10-CM

## 2024-09-24 DIAGNOSIS — Z78.0 POSTMENOPAUSAL: ICD-10-CM

## 2024-09-24 DIAGNOSIS — R92.8 ABNORMAL MAMMOGRAM: ICD-10-CM

## 2024-09-24 PROCEDURE — 77080 DXA BONE DENSITY AXIAL: CPT | Performed by: INTERNAL MEDICINE

## 2024-09-24 PROCEDURE — 77062 BREAST TOMOSYNTHESIS BI: CPT | Performed by: INTERNAL MEDICINE

## 2024-09-24 PROCEDURE — 77066 DX MAMMO INCL CAD BI: CPT | Performed by: INTERNAL MEDICINE

## 2024-09-26 ENCOUNTER — APPOINTMENT (OUTPATIENT)
Dept: PHYSICAL THERAPY | Age: 69
End: 2024-09-26
Attending: INTERNAL MEDICINE
Payer: MEDICARE

## 2024-09-30 ENCOUNTER — APPOINTMENT (OUTPATIENT)
Dept: PHYSICAL THERAPY | Age: 69
End: 2024-09-30
Attending: INTERNAL MEDICINE
Payer: MEDICARE

## 2024-10-01 ENCOUNTER — TELEPHONE (OUTPATIENT)
Dept: PHYSICAL THERAPY | Facility: HOSPITAL | Age: 69
End: 2024-10-01

## 2024-10-03 ENCOUNTER — APPOINTMENT (OUTPATIENT)
Dept: PHYSICAL THERAPY | Age: 69
End: 2024-10-03
Attending: INTERNAL MEDICINE
Payer: MEDICARE

## 2024-10-07 ENCOUNTER — APPOINTMENT (OUTPATIENT)
Dept: PHYSICAL THERAPY | Age: 69
End: 2024-10-07
Attending: INTERNAL MEDICINE
Payer: MEDICARE

## 2024-10-10 ENCOUNTER — TELEPHONE (OUTPATIENT)
Dept: PHYSICAL THERAPY | Facility: HOSPITAL | Age: 69
End: 2024-10-10

## 2024-10-10 ENCOUNTER — APPOINTMENT (OUTPATIENT)
Dept: PHYSICAL THERAPY | Age: 69
End: 2024-10-10
Attending: INTERNAL MEDICINE
Payer: MEDICARE

## 2024-10-15 ENCOUNTER — TELEPHONE (OUTPATIENT)
Facility: CLINIC | Age: 69
End: 2024-10-15

## 2024-10-15 ENCOUNTER — OFFICE VISIT (OUTPATIENT)
Facility: CLINIC | Age: 69
End: 2024-10-15
Payer: COMMERCIAL

## 2024-10-15 ENCOUNTER — TELEPHONE (OUTPATIENT)
Dept: PHYSICAL THERAPY | Facility: HOSPITAL | Age: 69
End: 2024-10-15

## 2024-10-15 VITALS
HEIGHT: 60 IN | BODY MASS INDEX: 30.43 KG/M2 | DIASTOLIC BLOOD PRESSURE: 78 MMHG | HEART RATE: 88 BPM | SYSTOLIC BLOOD PRESSURE: 137 MMHG | WEIGHT: 155 LBS

## 2024-10-15 DIAGNOSIS — Z80.0 FAMILY HISTORY OF COLON CANCER: Primary | ICD-10-CM

## 2024-10-15 DIAGNOSIS — Z80.0 FAMILY HISTORY OF COLON CANCER: ICD-10-CM

## 2024-10-15 DIAGNOSIS — Z12.11 SCREENING FOR COLON CANCER: Primary | ICD-10-CM

## 2024-10-15 DIAGNOSIS — Z86.0100 HISTORY OF COLON POLYPS: ICD-10-CM

## 2024-10-15 DIAGNOSIS — K51.40 PSEUDOPOLYPOSIS OF COLON, UNSPECIFIED COMPLICATION STATUS, UNSPECIFIED PART OF COLON (HCC): ICD-10-CM

## 2024-10-15 PROCEDURE — S0285 CNSLT BEFORE SCREEN COLONOSC: HCPCS | Performed by: NURSE PRACTITIONER

## 2024-10-15 NOTE — PROGRESS NOTES
SPINE EVALUATION:     Diagnosis:   Acute bilateral low back pain with left sided sciatica (M54.42) Referring Provider: Jane Turner  Date of Evaluation:    10/16/2024    Precautions:  None, osteopenia Next MD visit:   none scheduled  Date of Surgery: n/a  Symptom onset: 3-4 months ago     PATIENT SUMMARY   Megan Dwyer is a 69 year old female who presents to therapy today with complaints of low back and LLE pain.  Denies any fall or RHONDA. Pain is located across low back, L buttock, posterior LLE and medial 1st-2nd toes. Numbness plantar surface of L foot.  Does not awaken her from sleep.       Pt describes pain level at best 2/10, at worst 7/10.   Current functional limitations include painful first 10 min of walking, up/down stairs, lifting LLE, bed mobility, mopping floor.     Megan describes prior level of function full and painfree walking, housework. Pt goals include resolve pain c walking, household chores.  Past medical history was reviewed with Megan. Significant findings include Htn, hyperlipidemia, cervical radiculopathy, osteopenia, hx of L ankle fx and internal hardware  Pt denies diplopia, dysarthria, dysphasia, dizziness, drop attacks, bowel/bladder changes, saddle anesthesia, and BRUCE LE N/T.    ASSESSMENT  Megan presents to physical therapy evaluation with primary c/o low back pain radiating to LLE. The results of the objective tests and measures show painfully limited lumbar and L hip ROM, + nerve tension LLE, painfully decr LLE strength, impaired posture.  Functional deficits include but are not limited to pain c walking, stairs, mopping, bed mobility.  Signs and symptoms are consistent with rehab diagnosis. Pt and PT discussed evaluation findings, pathology, POC and HEP.  Pt voiced understanding and performs HEP correctly without reported pain. Skilled Physical Therapy is medically necessary to address the above impairments and reach functional goals.     OBJECTIVE:   Observation/Posture: incr  lordosis at L4-S1  Neuro Screen: intact to light touch BLEs (x for foot not tested; plantar L N/T reported)    Lumbar AROM: (* denotes performed with pain)  Flexion: 29cm*  Extension: mod limitation* (more painful than flexion)  Sidebending: R 46cm; L 50cm*low back  Rotation: R WFL; L WFL*    Hip PROM: (* denotes performed with pain)  Flexion: R 100 / L 100*back  Abd: R 40 / L 40  ER: R 45 / L 45  IR: R 15 / L 15  Hamstring flexibility: R 45 / L 30*    Accessory motion: marked pain c attempted central PA lumbar spine ; WNL but painful PA at L Sij and central sacrum  Palpation: TTP L3-5 spinous processes, non-TTP B lumbar paraspinals    Strength: (* denotes performed with pain)  @eval 10-16-24   Hip flexion (L2): R 4/5; L 4-*low back  Hip abduction: R 4/5; L 4-*low back  Hip Extension: R 4/5; L 3+*/5   Hip ER: R 4/5; L 4*/5  Hip IR: R 4/5; L 4-/5  Hamstring: R 5/5; L 5/5   Quad (L3): R 4+/5; L 4-*low back  DF (L4): R 5/5; L 5/5  Great Toe Ext (L5): NT  PF (S1): R 5/5; L 5/5     Special tests:   DTRs: patellar 2+B ; achilles 2+B  +slump LLE seated c knee ext+ankle DF  + SLR L for back pain    Gait: pt ambulates on level ground with  non-antalgic gait, mild incr L foot pronation, mild swayback posture c anterior pelvis, decr step length B, fair speed .  Stairs: TBA  Balance: NT    Today’s Treatment and Response:   Pt education was provided on exam findings, treatment diagnosis, treatment plan, expectations, and prognosis. Pt was also provided recommendations for modalities as needed [ice/heat], postural corrections, and anatomy of spine c 3D model.  Patient was instructed in and issued a HEP for:   Access Code: [email or web address] ; URL: [email or web address] ; Prepared by: Daisy Marie  Date: 10/16/2024  Exercises  - Supine Posterior Pelvic Tilt  - 2 x daily - 2 sets - 10 reps - 5 hold  - Hooklying Single Knee to Chest Stretch  - 2 x daily - 3 reps - 10 hold    Charges: PT Michael Moderate Complexity, therex-1       Total Timed Treatment: 10 min     Total Treatment Time: 45 min     Based on clinical rationale and outcome measures, this evaluation involved Moderate Complexity decision making due to 1-2 personal factors/comorbidities, 3 body structures involved/activity limitations, and evolving symptoms including changing pain levels.  PLAN OF CARE:    Goals: (to be met in 8 visits)   Consistently decr pain < or = 2/10 intermittent for incr QOL and activity tolerance  Overall incr in function as indicated by incr LEFS at least 20 pts  Centralize symptoms to lumbar spine to indicate decr irritability of symptoms  Painfree AROM lumbar spine and L hip to allow painfree ADLs, bed mobility  Incr LLE MMT at least 1/2 grade painfree throughout for incr mm support for WB activities  Pt can consistently stand/walk at least 30 min bouts painfree for incr community mobility and household tasks  Pt demo good body mechanics c squatting, mopping to promote painfree ability to perform household tasks  Pt can consistently ascend/descend stairs painfree for PLOF  Indep HEP to promote cont progress toward functional goals    Frequency / Duration: Patient will be seen for 1-2 x/week or a total of 8 visits over a 90 day period. Treatment will include: Manual Therapy, Neuromuscular Re-education, Therapeutic Exercise, and Home Exercise Program instruction    Education or treatment limitation: None  Rehab Potential:good    LEFS Score  LEFS Score: 37.5 % (10/16/2024  3:22 PM)      Patient/Family/Caregiver was advised of these findings, precautions, and treatment options and has agreed to actively participate in planning and for this course of care.    Thank you for your referral. Please co-sign or sign and return this letter via fax as soon as possible to [phone]. If you have any questions, please contact me at Dept: [phone]    Sincerely,  Electronically signed by therapist: Daisy Marie, PT    Physician's certification required: Yes  I  certify the need for these services furnished under this plan of treatment and while under my care.    X___________________________________________________ Date____________________    Certification From: 10/16/2024  To:1/14/2025

## 2024-10-15 NOTE — TELEPHONE ENCOUNTER
Scheduled for:  Colonoscopy 37639  Provider Name:  Dr. Singleton   Date:    Location:[facility]  Sedation:  MAC  Time: (pt is aware that [facility] will call the day before to confirm arrival time)  Prep:  GOLYTELY  Meds/Allergies Reconciled?:  DARIEL Noel  Diagnosis with codes:  family history of cancer Z 80.0, colon polyps K63.5  Was patient informed to call insurance with codes (Y/N):  Yes  Referral sent?:  Referral was sent at the time of electronic surgical scheduling.  EM or [facility] notified?:  I sent an electronic request to Endo Scheduling and received a confirmation today.  Medication Orders:  Pt is aware to NOT take iron pills, herbal meds and diet supplements for 7 days before exam. Also to NOT take any form of alcohol, recreational drugs and any forms of ED meds 24 hours before exam.   Misc Orders:       Further instructions given by staff:  I provide prep instructions to patient at the time of the appointment and reviewed date, time and location, she verbalized that she understood and is aware to call if she has any questions.

## 2024-10-15 NOTE — PATIENT INSTRUCTIONS
1. Schedule colonoscopy with General Pool Endoscopist  Diagnosis: colon cancer screening, family history colon cancer, history of colon polyps  Sedation: MAC  Prep: split dose golytely    2.  bowel prep from pharmacy   You can pick the bowel prep up now and store in a cool, dry place in your home until your scheduled bowel prep start date.    3. Continue all medications as normal for your procedure. DO NOT TAKE: Any form of alcohol, recreational drugs and any forms of erectile dysfunction medications 24 hours prior to procedure.    4. Read all bowel prep instructions carefully. Bowel prep instructions can also be found online at:  [email or web address]     5. AVOID seeds, nuts, popcorn, raw fruits and vegetables for 5 days before procedure    6. If you start any NEW medication after your visit today, please notify us. Certain medications (like iron or weight loss medications) will need to be held before the procedure, or the procedure cannot be performed safely.

## 2024-10-15 NOTE — H&P
[facility]                                                                                                  Clinic History and Physical     Chief Complaint   Patient presents with    Colonoscopy Screening       Requesting physician or provider: Jane Turner MD    HPI:   Megan Dwyer is a 69 year old year-old female with history of hypertension, hyperlipidemia. Surgical history cholecystectomy and appendectomy. The patient presents for colon cancer screening evaluation.    Last colonoscopy 2021 at [facility]. 2 polyps found, given 3 year recall.  Brother had colon cancer.     Patient denies any GI symptoms of nausea, vomiting, heartburn, dyspepsia, dysphagia, hematemesis, abdominal pain, change in bowel habits, constipation, diarrhea, hematochezia, or melena.  Additionally there is no unintentional weight loss.    Last colonoscopy: 5/8/21  FINDINGS  Two, 5 mm size sessile polyps observed in the distal ascending colon. Polypectomies  performed using a cold snare. Polyps retrieved, histology pending.    6 mm size sessile polyp observed in the mid-transverse colon. Polypectomy performed using  a cold snare. Polyp retrieved, histology pending.  6 mm size sessile polyp observed in the mid-descending colon. Polypectomy performed  using a cold snare. Polyp retrieved, histology pending.  Markedly redundant sigmoid and transverse colon.  Two passes made to evaluate the right colon.  Internal hemorrhoids.  ENDOSCOPIC DIAGNOSIS  Colon polyps  Internal hemorrhoids  RECOMMENDATIONS  High fiber diet.  Hepatitis C antibody at PCP's office.  Colonoscopy in 3 years.      Last EGD: none    NSAIDS: none  Tobacco: none  Alcohol: none  Marijuana: none  Illicit drugs: none    FH GI malignancy:  brother at about age 60    No history of adverse reaction to sedation  No RUDI  No anticoagulants/antiplatelet  No pacemaker/defibrillator  No pain medications and/or sleep aides    Wt Readings from Last 6 Encounters:    10/15/24 155 lb (70.3 kg)   24 152 lb 12.8 oz (69.3 kg)   22 153 lb (69.4 kg)   22 150 lb (68 kg)   21 147 lb (66.7 kg)   21 147 lb (66.7 kg)          History, Medications, Allergies, ROS:      Past Medical History:    Arthritis    Back pain    Bronchitis    COVID-19    High blood pressure    High cholesterol    Irregular bowel habits    Osteoporosis    Pneumonia    Sepsis (HCC)    Wears glasses      Past Surgical History:   Procedure Laterality Date    Ankle fracture surgery      left     Appendectomy            Carpal tunnel release Bilateral     Cholecystectomy      Needle biopsy left      2 site neg bxs    Spine surgery procedure unlisted        Family Hx:   Family History   Problem Relation Age of Onset    Diabetes Father     Cancer Mother     Diabetes Brother     Colon Cancer Brother       Social History:   Social History     Socioeconomic History    Marital status:    Tobacco Use    Smoking status: Never    Smokeless tobacco: Never   Vaping Use    Vaping status: Never Used   Substance and Sexual Activity    Alcohol use: Yes     Comment: socially    Drug use: Never   Other Topics Concern    Caffeine Concern Yes     Comment: 1-2 cups of coffee daily    Exercise No    Seat Belt Yes     Social Drivers of Health      Received from [facility]        Medications (Active prior to today's visit):  Current Outpatient Medications   Medication Sig Dispense Refill    polyethylene glycol, PEG 3350-KCl-NaBcb-NaCl-NaSulf, 236 g Oral Recon Soln Take 4,000 mL by mouth As Directed. Take 2,000 mL the night before your procedure and 2,000 mL the morning of your procedure. 1 each 0    atorvastatin 20 MG Oral Tab Take 1 tablet (20 mg total) by mouth nightly. 30 tablet 0    Telmisartan 80 MG Oral Tab Take 1 tablet (80 mg total) by mouth daily. 30 tablet 0    Ergocalciferol (VITAMIN D OR) Take 1 tablet by mouth daily.      Ascorbic Acid (VITAMIN C OR) Take 1 tablet  by mouth daily.      famotidine 20 MG Oral Tab Take 1 tablet (20 mg total) by mouth 2 (two) times daily. 180 tablet 1       Allergies:  Allergies[1]    ROS:   CONSTITUTIONAL: negative for fevers, chills, sweats  EYES Negative for scleral icterus or redness, and diplopia  HEENT: Negative for hoarseness  RESPIRATORY: Negative for cough and severe shortness of breath  CARDIOVASCULAR: Negative for crushing sub-sternal chest pain  GASTROINTESTINAL: See HPI  GENITOURINARY: Negative for dysuria  MUSCULOSKELETAL: Negative for arthralgias and myalgias  SKIN: Negative for jaundice, rash or pruritus  NEUROLOGICAL: Negative for dizziness and headaches  BEHAVIOR/PSYCH: Negative for psychotic behavior      PHYSICAL EXAM:   Blood pressure 137/78, pulse 88, height 5' (1.524 m), weight 155 lb (70.3 kg).    GEN: Alert, no acute distress, well-nourished   HEENT: anicteric sclera, neck supple, trachea midline, MMM, no palpable or tender neck or supraclavicular lymph nodes  CV: RRR, the extremities are warm and well perfused   LUNGS: No increased work of breathing, CTAB  ABDOMEN: Soft, symmetrical, non-tender without distention or guarding. No scars or lesions. Aorta is without bruit or visible pulsation. Umbilicus is midline without herniation. Normoactive bowel sounds are present, No masses, hepatomegaly or splenomegaly noted.  MSK: No erythema, no warmth, no swelling of joints  SKIN: No jaundice, no erythema, no rashes, no lesions  HEMATOLOGIC: No bleeding, no bruising  NEURO: Alert and interactive, GUTIERREZ  PSYCH: appropriate mood & affect    Labs/Imaging:     Patient's labs and imaging were reviewed and discussed with patient today.    .  ASSESSMENT/PLAN:   69 year old female presents for screening.    #family history colon cancer  #history of polyps  #colorectal cancer screening: patient is considered average risk for colon cancer (No immediate family hx of colon cancer) and it is appropriate to proceed with screening colonoscopy.  Patient is currently asymptomatic and denies diarrhea, hematochezia, thin-stools or weight loss. We discussed risks/benefits/alternatives to procedure, including stool testing, they want to proceed with colonoscopy.      -Anti-platelets and anti-coagulants: none  -Diabetes meds: none    Patient Instructions   1. Schedule colonoscopy with General Pool Endoscopist  Diagnosis: colon cancer screening, family history colon cancer, history of colon polyps  Sedation: MAC  Prep: split dose golytely    2.  bowel prep from pharmacy   You can pick the bowel prep up now and store in a cool, dry place in your home until your scheduled bowel prep start date.    3. Continue all medications as normal for your procedure. DO NOT TAKE: Any form of alcohol, recreational drugs and any forms of erectile dysfunction medications 24 hours prior to procedure.    4. Read all bowel prep instructions carefully. Bowel prep instructions can also be found online at:  [email or web address]     5. AVOID seeds, nuts, popcorn, raw fruits and vegetables for 5 days before procedure    6. If you start any NEW medication after your visit today, please notify us. Certain medications (like iron or weight loss medications) will need to be held before the procedure, or the procedure cannot be performed safely.             Endoscopy risk/benefit discussion: I have thoroughly discussed the risks, benefits, and alternatives of endoscopic evaluation with the patient, who demonstrated understanding. This includes the potential risks of bleeding, infection, pain, anesthesia complications, and perforation, which may result in prolonged hospitalization or surgical intervention. All of the patient’s questions were addressed to their satisfaction. The patient has chosen to proceed with the endoscopic procedure, including any necessary interventions such as polypectomy, biopsy, control of bleeding.      Orders This Visit:  No orders of the defined types were  placed in this encounter.      Meds This Visit:  Requested Prescriptions     Signed Prescriptions Disp Refills    polyethylene glycol, PEG 3350-KCl-NaBcb-NaCl-NaSulf, 236 g Oral Recon Soln 1 each 0     Sig: Take 4,000 mL by mouth As Directed. Take 2,000 mL the night before your procedure and 2,000 mL the morning of your procedure.       Imaging & Referrals:  None       JANIE Loza    [facility]  10/15/2024               [1] No Known Allergies

## 2024-10-16 ENCOUNTER — OFFICE VISIT (OUTPATIENT)
Dept: PHYSICAL THERAPY | Age: 69
End: 2024-10-16
Attending: INTERNAL MEDICINE
Payer: MEDICARE

## 2024-10-16 DIAGNOSIS — M54.42 ACUTE BILATERAL LOW BACK PAIN WITH LEFT-SIDED SCIATICA: Primary | ICD-10-CM

## 2024-10-16 PROCEDURE — 97162 PT EVAL MOD COMPLEX 30 MIN: CPT

## 2024-10-16 PROCEDURE — 97110 THERAPEUTIC EXERCISES: CPT

## 2024-10-17 ENCOUNTER — APPOINTMENT (OUTPATIENT)
Dept: PHYSICAL THERAPY | Age: 69
End: 2024-10-17
Attending: INTERNAL MEDICINE
Payer: MEDICARE

## 2024-10-21 ENCOUNTER — APPOINTMENT (OUTPATIENT)
Dept: PHYSICAL THERAPY | Age: 69
End: 2024-10-21
Attending: INTERNAL MEDICINE
Payer: MEDICARE

## 2024-10-22 ENCOUNTER — OFFICE VISIT (OUTPATIENT)
Dept: PHYSICAL THERAPY | Age: 69
End: 2024-10-22
Attending: INTERNAL MEDICINE
Payer: MEDICARE

## 2024-10-22 DIAGNOSIS — M54.42 ACUTE BILATERAL LOW BACK PAIN WITH LEFT-SIDED SCIATICA: Primary | ICD-10-CM

## 2024-10-22 PROCEDURE — 97110 THERAPEUTIC EXERCISES: CPT

## 2024-10-22 NOTE — PROGRESS NOTES
PT DAILY NOTE  Diagnosis:   Acute bilateral low back pain with left sided sciatica (M54.42) Referring Provider: Jane Turner  Date of Evaluation:    10/16/2024    Precautions:  None, osteopenia Next MD visit:   none scheduled  Date of Surgery: n/a  Symptom onset: 3-4 months ago     Insurance Primary/Secondary: [employer]     Visit 2 of 8   Date POC Expires: 12-16-24       Subjective: Pt states her pain has been better. Low back pain radiates to lateral LLE to the lat thigh. N/T in lateral toes.  Pain:      6/10   @eval: Megan Dwyer is a 69 year old female who presents to therapy today with complaints of low back and LLE pain.  Denies any fall or RHONDA. Pain is located across low back, L buttock, posterior LLE and medial 1st-2nd toes. Numbness plantar surface of L foot.  Does not awaken her from sleep.    Pt describes pain level at best 2/10, at worst 7/10.   Current functional limitations include painful first 10 min of walking, up/down stairs, lifting LLE, bed mobility, mopping floor.   Megan describes prior level of function full and painfree walking, housework. Pt goals include resolve pain c walking, household chores.  Past medical history was reviewed with Megan. Significant findings include Htn, hyperlipidemia, cervical radiculopathy, osteopenia, hx of L ankle fx and internal hardware  Pt denies diplopia, dysarthria, dysphasia, dizziness, drop attacks, bowel/bladder changes, saddle anesthesia, and BRUCE LE N/T.    Objective:   Pain reported L buttock c attempting bridge so d /c    Treatment:  (\"NP\" indicates Not Performed this date)  Manual Therapy:  STM L glutes/piriformis c ball in S/L    Neuromuscular Re-education:    Therapeutic Exercise: Eval 10-16-24    10-22-24   NuStep for CKC Strengthening and spinal rotation  5min    SKTC stretch R,L 10\"x3ea 10'x3ea    Hooklying PPT 5\"x5 5\"x10    Hooklying core stab c minimarch  2'    Hooklying core stab +hip add vs ball  5\"x20    Hooklying L HS heural mob    1x10   LTR   2'   BKTC c SB   2'    S/L hip ER R,L    3\"x10ea    sidestepping    1'                       HEP:Access Code: [email or web address] ; URL: [email or web address] ; Prepared by: Daisy Marie  Date: 10/16/2024  Exercises  - Supine Posterior Pelvic Tilt  - 2 x daily - 2 sets - 10 reps - 5 hold  - Hooklying Single Knee to Chest Stretch  - 2 x daily - 3 reps - 10 hold  10-22: add - Supine Lower Trunk Rotation  - 2 x daily - 2 minutes ; - Supine March  - 2 x daily - 2 minutes ; - Clamshell  - 2 x daily - 10 reps - 3 hold    Assessment/Plan: Pt reports centralization of pain to L posterior hip at end of session, but no decrease in severity of pain. Then returned to table for gentle STM c ball to L glutes/piriformis, after which she reports some reduction in pain intensity. Additions to HEP per above. Continue to address deficits to work toward PLOF and set goals.    PLAN OF CARE:    Goals: (to be met in 8 visits)   Consistently decr pain < or = 2/10 intermittent for incr QOL and activity tolerance  Overall incr in function as indicated by incr LEFS at least 20 pts  Centralize symptoms to lumbar spine to indicate decr irritability of symptoms  Painfree AROM lumbar spine and L hip to allow painfree ADLs, bed mobility  Incr LLE MMT at least 1/2 grade painfree throughout for incr mm support for WB activities  Pt can consistently stand/walk at least 30 min bouts painfree for incr community mobility and household tasks  Pt demo good body mechanics c squatting, mopping to promote painfree ability to perform household tasks  Pt can consistently ascend/descend stairs painfree for PLOF  Indep HEP to promote cont progress toward functional goals    Frequency / Duration: Patient will be seen for 1-2 x/week or a total of 8 visits over a 90 day period.     All Treatments performed at distinct and separate times during the therapy session.  Treatment Minutes  Units charged   Manual Therapy 5 minutes    Therapeutic  Activity 0 minutes    Neuromuscular Re-education 0 minutes    Therapeutic Exercise 40 minutes 3   Total Direct Treatment Time 45 minutes

## 2024-10-23 ENCOUNTER — APPOINTMENT (OUTPATIENT)
Dept: PHYSICAL THERAPY | Age: 69
End: 2024-10-23
Attending: INTERNAL MEDICINE
Payer: MEDICARE

## 2024-10-28 ENCOUNTER — APPOINTMENT (OUTPATIENT)
Dept: PHYSICAL THERAPY | Age: 69
End: 2024-10-28
Attending: INTERNAL MEDICINE
Payer: MEDICARE

## 2024-10-28 ENCOUNTER — OFFICE VISIT (OUTPATIENT)
Dept: PHYSICAL THERAPY | Age: 69
End: 2024-10-28
Attending: INTERNAL MEDICINE
Payer: MEDICARE

## 2024-10-28 DIAGNOSIS — M54.42 ACUTE BILATERAL LOW BACK PAIN WITH LEFT-SIDED SCIATICA: Primary | ICD-10-CM

## 2024-10-28 PROCEDURE — 97110 THERAPEUTIC EXERCISES: CPT

## 2024-10-28 NOTE — PROGRESS NOTES
PT DAILY NOTE  Diagnosis:   Acute bilateral low back pain with left sided sciatica (M54.42) Referring Provider: Jane Turner  Date of Evaluation:    10/16/2024    Precautions:  None, osteopenia Next MD visit:   none scheduled  Date of Surgery: n/a  Symptom onset: 3-4 months ago     Insurance Primary/Secondary: [employer]/[place] [employer]     Visit 3 of 8   Date POC Expires: 12-16-24       Subjective: Pt report she has the pain on Left lumbar to Left foot/toes  Pain:      3/10   @eval: Megan Dwyer is a 69 year old female who presents to therapy today with complaints of low back and LLE pain.  Denies any fall or RHONDA. Pain is located across low back, L buttock, posterior LLE and medial 1st-2nd toes. Numbness plantar surface of L foot.  Does not awaken her from sleep.    Pt describes pain level at best 2/10, at worst 7/10.   Current functional limitations include painful first 10 min of walking, up/down stairs, lifting LLE, bed mobility, mopping floor.   Megan describes prior level of function full and painfree walking, housework. Pt goals include resolve pain c walking, household chores.  Past medical history was reviewed with Megan. Significant findings include Htn, hyperlipidemia, cervical radiculopathy, osteopenia, hx of L ankle fx and internal hardware  Pt denies diplopia, dysarthria, dysphasia, dizziness, drop attacks, bowel/bladder changes, saddle anesthesia, and BRUCE LE N/T.    Objective:   (+) ASLR LLE post chain      Pain reported L buttock c attempting bridge so d /c    Treatment:  (\"NP\" indicates Not Performed this date)  Manual Therapy:    Neuromuscular Re-education:    Therapeutic Exercise: Eval 10-16-24    10-22-24   10-28-24   NuStep for CKC Strengthening and spinal rotation  5min  L6 x6   SKTC stretch R,L 10\"x3ea 10'x3ea  -   Hooklying PPT 5\"x5 5\"x10  5\"x10   Hooklying core stab c minimarch  2'  2'   Hooklying core stab +hip add vs ball  5\"x20  5\"x20    Hooklying L HS heural mob   1x10  1' x2    LTR   2' 2'    BKTC c SB   2' 2'    S/L hip ER R,L    3\"x10ea 5\"x20 ea    sidestepping    1'   -   Gastroc stretch on incline board   30\"x3   Piriformis stretch B (L>R)   30\"x3   Bridges    3x10        HEP:Access Code: [email or web address] ; URL: [email or web address] ; Prepared by: Daisy Marie  Date: 10/16/2024  Exercises  - Supine Posterior Pelvic Tilt  - 2 x daily - 2 sets - 10 reps - 5 hold  - Hooklying Single Knee to Chest Stretch  - 2 x daily - 3 reps - 10 hold  10-22: add - Supine Lower Trunk Rotation  - 2 x daily - 2 minutes ; - Supine March  - 2 x daily - 2 minutes ; - Clamshell  - 2 x daily - 10 reps - 3 hold    Assessment/Plan: Pt reports relieving pain with stretches and indicated compliance with home exercises. She has felt  foot/toes tingling when assumed prone on elbow position but was able to do the rest of the exercises without increased radicular symptoms.Continue with plan of care to work on centralizing pain/symptoms and progress with functional stabilization exercises.  PLAN OF CARE:    Goals: (to be met in 8 visits)   Consistently decr pain < or = 2/10 intermittent for incr QOL and activity tolerance  Overall incr in function as indicated by incr LEFS at least 20 pts  Centralize symptoms to lumbar spine to indicate decr irritability of symptoms  Painfree AROM lumbar spine and L hip to allow painfree ADLs, bed mobility  Incr LLE MMT at least 1/2 grade painfree throughout for incr mm support for WB activities  Pt can consistently stand/walk at least 30 min bouts painfree for incr community mobility and household tasks  Pt demo good body mechanics c squatting, mopping to promote painfree ability to perform household tasks  Pt can consistently ascend/descend stairs painfree for PLOF  Indep HEP to promote cont progress toward functional goals    Frequency / Duration: Patient will be seen for 1-2 x/week or a total of 8 visits over a 90 day period.     All Treatments performed at distinct and separate  times during the therapy session.  Treatment Minutes  Units charged   Manual Therapy 0 minutes    Therapeutic Activity 0 minutes    Neuromuscular Re-education 0 minutes    Therapeutic Exercise 40minutes 3   Total Direct Treatment Time 40 minutes

## 2024-10-30 ENCOUNTER — APPOINTMENT (OUTPATIENT)
Dept: PHYSICAL THERAPY | Age: 69
End: 2024-10-30
Attending: INTERNAL MEDICINE
Payer: MEDICARE

## 2024-11-04 NOTE — PROGRESS NOTES
PT DAILY NOTE  Diagnosis:   Acute bilateral low back pain with left sided sciatica (M54.42) Referring Provider: Jane Turner  Date of Evaluation:    10/16/2024    Precautions:  None, osteopenia Next MD visit:   none scheduled  Date of Surgery: n/a  Symptom onset: 3-4 months ago     Insurance Primary/Secondary: [employer] [facility]/[place] HMO     Visit 4 of 8   Date POC Expires: 12-16-24       Subjective: Pt states minimal pain in LLE today, it's less than it was but it is always down the whole leg.  Pain:      3/10   @eval: Megan Dwyer is a 69 year old female who presents to therapy today with complaints of low back and LLE pain.  Denies any fall or RHONDA. Pain is located across low back, L buttock, posterior LLE and medial 1st-2nd toes. Numbness plantar surface of L foot.  Does not awaken her from sleep.    Pt describes pain level at best 2/10, at worst 7/10.   Current functional limitations include painful first 10 min of walking, up/down stairs, lifting LLE, bed mobility, mopping floor.   Megan describes prior level of function full and painfree walking, housework. Pt goals include resolve pain c walking, household chores.  Past medical history was reviewed with Megan. Significant findings include Htn, hyperlipidemia, cervical radiculopathy, osteopenia, hx of L ankle fx and internal hardware  Pt denies diplopia, dysarthria, dysphasia, dizziness, drop attacks, bowel/bladder changes, saddle anesthesia, and BRUCE LE N/T.    Objective:   Attempted dead bug c reciprocal legs to 90/90, but pt reports discomfort    Treatment:  (\"NP\" indicates Not Performed this date)  Manual Therapy:    Neuromuscular Re-education:    Therapeutic Exercise: Eval 10-16-24    10-22-24   10-28-24   11-5-24   NuStep for CKC Strengthening and spinal rotation  5min  L6 x6 5min   SKTC stretch R,L 10\"x3ea 10'x3ea  -    Hooklying PPT 5\"x5 5\"x10  5\"x10 5\"x20   Hooklying core stab c minimarch  2'  2' 2'   Hooklying core stab +hip add vs ball  5\"x20  5\"x20      Hooklying L HS heural mob   1x10  1' x2     LTR   2' 2' 2'   BKTC c SB   2' 2'     S/L hip ER R,L    3\"x10ea 5\"x20 ea 5\"x20 ea    sidestepping    1'   -    Gastroc stretch on incline board   30\"x3 1'   Piriformis stretch B (L>R)   30\"x3 30\"x3   Bridges    3x10 3x10   Standing core stab c UE push into SB on table    5\"x2min   Standing hip ext R,L    x20ea               HEP:Access Code: [email or web address] ; URL: [email or web address] ; Prepared by: Daisy Marie  Date: 10/16/2024  Exercises  - Supine Posterior Pelvic Tilt  - 2 x daily - 2 sets - 10 reps - 5 hold  - Hooklying Single Knee to Chest Stretch  - 2 x daily - 3 reps - 10 hold  10-22: add - Supine Lower Trunk Rotation  - 2 x daily - 2 minutes ; - Supine March  - 2 x daily - 2 minutes ; - Clamshell  - 2 x daily - 10 reps - 3 hold    Assessment/Plan: Pt reports decrease in pain intensity overall. At end of session today her pain centralized to L buttock. She had pain c attempted dead bug progression but no pain reported c other new/progressed ex's. Continue to progress core and hip strength as tolerated.     PLAN OF CARE:    Goals: (to be met in 8 visits)   Consistently decr pain < or = 2/10 intermittent for incr QOL and activity tolerance  Overall incr in function as indicated by incr LEFS at least 20 pts  Centralize symptoms to lumbar spine to indicate decr irritability of symptoms  Painfree AROM lumbar spine and L hip to allow painfree ADLs, bed mobility  Incr LLE MMT at least 1/2 grade painfree throughout for incr mm support for WB activities  Pt can consistently stand/walk at least 30 min bouts painfree for incr community mobility and household tasks  Pt demo good body mechanics c squatting, mopping to promote painfree ability to perform household tasks  Pt can consistently ascend/descend stairs painfree for PLOF  Indep HEP to promote cont progress toward functional goals    Frequency / Duration: Patient will be seen for 1-2 x/week or a total of  8 visits over a 90 day period.     All Treatments performed at distinct and separate times during the therapy session.  Treatment Minutes  Units charged   Manual Therapy 0 minutes    Therapeutic Activity 0 minutes    Neuromuscular Re-education 0 minutes    Therapeutic Exercise 45 minutes 3   Total Direct Treatment Time 45 minutes

## 2024-11-05 ENCOUNTER — OFFICE VISIT (OUTPATIENT)
Dept: PHYSICAL THERAPY | Age: 69
End: 2024-11-05
Attending: INTERNAL MEDICINE
Payer: MEDICARE

## 2024-11-05 DIAGNOSIS — M54.42 ACUTE BILATERAL LOW BACK PAIN WITH LEFT-SIDED SCIATICA: Primary | ICD-10-CM

## 2024-11-05 PROCEDURE — 97110 THERAPEUTIC EXERCISES: CPT

## 2024-11-06 NOTE — PROGRESS NOTES
Progress Summary  Pt has attended 5 visits in Physical Therapy.     Diagnosis:   Acute bilateral low back pain with left sided sciatica (M54.42) Referring Provider: Jane Turner  Date of Evaluation:    10/16/2024    Precautions:  None, osteopenia Next MD visit:   none scheduled  Date of Surgery: n/a  Symptom onset: 3-4 months ago     Insurance Primary[facility]Secondary[facility]     Visit 5 of 8   Date POC Expires: 12-16-24       Subjective: Pt states she has no pain today. Yesterday she had a little pain, when laying on L side in bed but none during the day. She has not had any recent pain on stairs.  Pain:      0/10   Assessment/Plan: Pt reports painfree today, and overall decreasing pain intensity c centralization to buttock. She demo improvement in LLE strength and decreasing neural tension LLE. She also demo increased lumbar AROM c decreased pain. HEP updated today and reviewed c pt. She is to self-manage c consistent HEP and f/u in 2 wks to reassess if pain persists.    @eval: Megan Dwyer is a 69 year old female who presents to therapy today with complaints of low back and LLE pain.  Denies any fall or RHONDA. Pain is located across low back, L buttock, posterior LLE and medial 1st-2nd toes. Numbness plantar surface of L foot.  Does not awaken her from sleep.    Pt describes pain level at best 2/10, at worst 7/10.   Current functional limitations include painful first 10 min of walking, up/down stairs, lifting LLE, bed mobility, mopping floor.   Megan describes prior level of function full and painfree walking, housework. Pt goals include resolve pain c walking, household chores.  Past medical history was reviewed with Megan. Significant findings include Htn, hyperlipidemia, cervical radiculopathy, osteopenia, hx of L ankle fx and internal hardware  Pt denies diplopia, dysarthria, dysphasia, dizziness, drop attacks, bowel/bladder changes, saddle anesthesia, and BRUCE LE N/T.    Objective:   Lumbar AROM: (*  denotes performed with pain)  Flexion: 18cm* LLE (vs eval: 29cm*)  Extension: mod limitation* (more painful than flexion) -- no change  Sidebending: R 39cm / L 41cm* L hip (vs eval: R 46cm; L 50cm*low back)  Rotation: R WFL; L WFL* -- no change     Hip PROM: (* denotes performed with pain)  Flexion: R 100 / L 100 (*vs eval: 100*back)  Hamstring flexibility:  L 45 deg (vs eval: R 45 / L 30*)     Strength: (* denotes performed with pain)  @eval 10-16-24 11-7-24   Hip flexion (L2): R 4/5; L 4-*low back  Hip abduction: R 4/5; L 4-*low back  Hip Extension: R 4/5; L 3+*/5            Hip ER: R 4/5; L 4*/5  Hip IR: R 4/5; L 4-/5  Hamstring: R 5/5; L 5/5            Quad (L3): R 4+/5; L 4-*low back  DF (L4): R 5/5; L 5/5  Great Toe Ext (L5): NT  PF (S1): R 5/5; L 5/5  L 4+*   L 4   L 4   L 4   L 4      L 4*knee        Special tests:   +slump LLE seated c knee ext+ankle DF -- mildly persists 11-7-24  + SLR L for back pain     Gait: pt ambulates on level ground with  non-antalgic gait, mild incr L foot pronation, mild swayback posture c anterior pelvis, decr step length B, fair speed . -- no change     Treatment:  (\"NP\" indicates Not Performed this date)  Manual Therapy:    Neuromuscular Re-education:    Therapeutic Exercise: Eval 10-16-24    10-22-24   10-28-24   11-5-24   11-7-24   NuStep for CKC Strengthening and spinal rotation  5min  L6 x6 5min 5min   SKTC stretch R,L 10\"x3ea 10'x3ea  -     Hooklying PPT 5\"x5 5\"x10  5\"x10 5\"x20 5\"x20   Hooklying core stab c minimarch  2'  2' 2'    Hooklying core stab +hip add vs ball  5\"x20  5\"x20      Hooklying L HS heural mob   1x10  1' x2   x10   LTR   2' 2' 2'    BKTC c SB   2' 2'      S/L hip ER R,L    3\"x10ea 5\"x20 ea 5\"x20 ea    Dead bug c alt LE ext and opp UE     2'    sidestepping    1'   -     Gastroc stretch on incline board   30\"x3 1'    Piriformis stretch B (L>R)   30\"x3 30\"x3 30\"x3   Bridges    3x10 3x10 x30   Standing core stab c UE push into SB on table    5\"x2min 5\"x2min    Standing hip ext R,L    x20ea x20ea   Reassessment, HEP review/progression     15'   Standing hip abd R,L     x20ea   Seated march 2'                HEP:Access Code: [email or web address] ; URL: [email or web address] ; Prepared by: Daisy Marie  upDate: 11/07/2024  - Supine Posterior Pelvic Tilt  - 1 x daily - 2 sets - 10 reps - 5 hold  - Supine Lower Trunk Rotation  - 1 x daily - 2 minutes  - Clamshell  - 1 x daily - 20 reps - 5 hold  - Supine Dead Bug with Leg Extension  - 1 x daily - 2 minutes  - Standing Hip Abduction with Counter Support  - 1 x daily - 20 reps  - Standing Hip Extension with Counter Support  - 1 x daily - 20 reps  - Supine Bridge  - 1 x daily - 30 reps - 3 hold  - Supine Piriformis Stretch with Foot on Ground  - 1 x daily - 3 reps - 20 hold  - Seated March  - 1 x daily - 2 minutes    PLAN OF CARE:    Goals: (to be met in 8 visits)   Consistently decr pain < or = 2/10 intermittent for incr QOL and activity tolerance--progressing, now intermittent 11-7-24  Overall incr in function as indicated by incr LEFS at least 20 pts--met 11-7-24  Centralize symptoms to lumbar spine to indicate decr irritability of symptoms--progressing, LLE discomfort c decr frequency, absent today 11-7-24  Painfree AROM lumbar spine and L hip to allow painfree ADLs, bed mobility--progressing 11-7-24  Incr LLE MMT at least 1/2 grade painfree throughout for incr mm support for WB activities--progressing 11-7-24  Pt can consistently stand/walk at least 30 min bouts painfree for incr community mobility and household tasks  Pt demo good body mechanics c squatting, mopping to promote painfree ability to perform household tasks--progressing 11-7-24  Pt can consistently ascend/descend stairs painfree for PLOF--progressing 11-7-24  Indep HEP to promote cont progress toward functional goals    All Treatments performed at distinct and separate times during the therapy session.  Treatment Minutes  Units charged   Manual  Therapy 0 minutes    Therapeutic Activity 0 minutes    Neuromuscular Re-education 0 minutes    Therapeutic Exercise 45 minutes 3   Total Direct Treatment Time 45 minutes      Post LEFS Score  Post LEFS Score: 81.25 % (11/7/2024  1:09 PM)    43.75 % improvement    Plan: Pt to self-manage for 2 wks and f/u to reassess if symptoms persist.      Patient/Family/Caregiver was advised of these findings, precautions, and treatment options and has agreed to actively participate in planning and for this course of care.    Thank you for your referral. If you have any questions, please contact me at Dept: [phone].    Sincerely,  Electronically signed by therapist: Daisy Marie PT     Physician's certification required:  Yes  Please co-sign or sign and return this letter via fax as soon as possible to [phone].   I certify the need for these services furnished under this plan of treatment and while under my care.    X___________________________________________________ Date____________________    Certification From: 11/7/2024  To:2/5/2025

## 2024-11-07 ENCOUNTER — OFFICE VISIT (OUTPATIENT)
Dept: PHYSICAL THERAPY | Age: 69
End: 2024-11-07
Attending: INTERNAL MEDICINE
Payer: MEDICARE

## 2024-11-07 DIAGNOSIS — M54.42 ACUTE BILATERAL LOW BACK PAIN WITH LEFT-SIDED SCIATICA: Primary | ICD-10-CM

## 2024-11-07 PROCEDURE — 97110 THERAPEUTIC EXERCISES: CPT

## 2024-11-11 PROBLEM — K64.8 INTERNAL HEMORRHOIDS: Status: ACTIVE | Noted: 2024-11-11

## 2024-11-20 ENCOUNTER — HOSPITAL ENCOUNTER (OUTPATIENT)
Dept: ULTRASOUND IMAGING | Age: 69
Discharge: HOME | End: 2024-11-20
Attending: INTERNAL MEDICINE
Payer: MEDICARE

## 2024-11-20 DIAGNOSIS — R92.30 DENSE BREAST: ICD-10-CM

## 2024-11-20 DIAGNOSIS — N64.59 OTHER SIGNS AND SYMPTOMS IN BREAST: ICD-10-CM

## 2024-11-20 PROCEDURE — 76641 ULTRASOUND BREAST COMPLETE: CPT | Performed by: INTERNAL MEDICINE

## 2024-11-23 ENCOUNTER — TELEPHONE (OUTPATIENT)
Facility: CLINIC | Age: 69
End: 2024-11-23

## 2024-11-25 NOTE — TELEPHONE ENCOUNTER
Health maintenance updated. Last colonoscopy done 11/11/24. 5 year recall placed into [facility] Outreach, next due on 11/2029 per Dr. Singleton.

## 2024-12-12 ENCOUNTER — LAB ENCOUNTER (OUTPATIENT)
Dept: LAB | Age: 69
End: 2024-12-12
Attending: INTERNAL MEDICINE
Payer: MEDICARE

## 2024-12-12 ENCOUNTER — OFFICE VISIT (OUTPATIENT)
Dept: INTERNAL MEDICINE CLINIC | Facility: CLINIC | Age: 69
End: 2024-12-12
Payer: MEDICARE

## 2024-12-12 VITALS
WEIGHT: 152 LBS | SYSTOLIC BLOOD PRESSURE: 118 MMHG | BODY MASS INDEX: 30 KG/M2 | DIASTOLIC BLOOD PRESSURE: 60 MMHG | HEART RATE: 68 BPM | OXYGEN SATURATION: 98 % | TEMPERATURE: 97 F

## 2024-12-12 DIAGNOSIS — M85.80 OSTEOPENIA, UNSPECIFIED LOCATION: ICD-10-CM

## 2024-12-12 DIAGNOSIS — E78.5 HYPERLIPIDEMIA, UNSPECIFIED HYPERLIPIDEMIA TYPE: Chronic | ICD-10-CM

## 2024-12-12 DIAGNOSIS — I10 BENIGN ESSENTIAL HTN: Primary | Chronic | ICD-10-CM

## 2024-12-12 LAB
ALBUMIN SERPL-MCNC: 4.5 G/DL (ref 3.2–4.8)
ALBUMIN/GLOB SERPL: 1.5 {RATIO} (ref 1–2)
ALP LIVER SERPL-CCNC: 60 U/L
ALT SERPL-CCNC: 33 U/L
ANION GAP SERPL CALC-SCNC: 4 MMOL/L (ref 0–18)
AST SERPL-CCNC: 23 U/L (ref ?–34)
BILIRUB SERPL-MCNC: 1 MG/DL (ref 0.2–1.1)
BUN BLD-MCNC: 11 MG/DL (ref 9–23)
CALCIUM BLD-MCNC: 9.9 MG/DL (ref 8.7–10.4)
CHLORIDE SERPL-SCNC: 105 MMOL/L (ref 98–112)
CHOLEST SERPL-MCNC: 156 MG/DL (ref ?–200)
CO2 SERPL-SCNC: 29 MMOL/L (ref 21–32)
CREAT BLD-MCNC: 0.86 MG/DL
EGFRCR SERPLBLD CKD-EPI 2021: 73 ML/MIN/1.73M2 (ref 60–?)
FASTING PATIENT LIPID ANSWER: YES
FASTING STATUS PATIENT QL REPORTED: YES
GLOBULIN PLAS-MCNC: 3 G/DL (ref 2–3.5)
GLUCOSE BLD-MCNC: 98 MG/DL (ref 70–99)
HDLC SERPL-MCNC: 43 MG/DL (ref 40–59)
LDLC SERPL CALC-MCNC: 80 MG/DL (ref ?–100)
NONHDLC SERPL-MCNC: 113 MG/DL (ref ?–130)
OSMOLALITY SERPL CALC.SUM OF ELEC: 285 MOSM/KG (ref 275–295)
POTASSIUM SERPL-SCNC: 4 MMOL/L (ref 3.5–5.1)
PROT SERPL-MCNC: 7.5 G/DL (ref 5.7–8.2)
SODIUM SERPL-SCNC: 138 MMOL/L (ref 136–145)
TRIGL SERPL-MCNC: 194 MG/DL (ref 30–149)
VLDLC SERPL CALC-MCNC: 31 MG/DL (ref 0–30)

## 2024-12-12 PROCEDURE — 99214 OFFICE O/P EST MOD 30 MIN: CPT | Performed by: INTERNAL MEDICINE

## 2024-12-12 PROCEDURE — 80053 COMPREHEN METABOLIC PANEL: CPT

## 2024-12-12 PROCEDURE — 80061 LIPID PANEL: CPT

## 2024-12-12 PROCEDURE — 3078F DIAST BP <80 MM HG: CPT | Performed by: INTERNAL MEDICINE

## 2024-12-12 PROCEDURE — 3074F SYST BP LT 130 MM HG: CPT | Performed by: INTERNAL MEDICINE

## 2024-12-12 PROCEDURE — 1159F MED LIST DOCD IN RCRD: CPT | Performed by: INTERNAL MEDICINE

## 2024-12-12 PROCEDURE — G2211 COMPLEX E/M VISIT ADD ON: HCPCS | Performed by: INTERNAL MEDICINE

## 2024-12-12 PROCEDURE — 36415 COLL VENOUS BLD VENIPUNCTURE: CPT

## 2024-12-12 PROCEDURE — 1160F RVW MEDS BY RX/DR IN RCRD: CPT | Performed by: INTERNAL MEDICINE

## 2024-12-12 NOTE — PROGRESS NOTES
Subjective: /  Megan Dwyer is a 69 year old female  who presents for Follow - Up (3 months)     Reports feeling well.     HTN-controlled.   Denies cp/sob/angina with walking/activities or climbing stairs       Osteopenia-previously on alendronate.   To have calcium/vitamin d in diet.   Repeat DEXA in 9/2026    HLD-has been more consistent with taking statin for the past 3 months.   To recheck labs      History/Other:    Chief Complaint Reviewed and Verified  Nursing Notes Reviewed and   Verified  Allergies Reviewed  Medications Reviewed  OB Status Reviewed           Current Outpatient Medications   Medication Sig Dispense Refill    CALCIUM OR Take 1,000-1,200 mg by mouth daily.      atorvastatin 20 MG Oral Tab Take 1 tablet (20 mg total) by mouth nightly. 30 tablet 0    Telmisartan 80 MG Oral Tab Take 1 tablet (80 mg total) by mouth daily. 30 tablet 0    Ergocalciferol (VITAMIN D OR) Take 2,000 Units by mouth daily.      Ascorbic Acid (VITAMIN C OR) Take 1 tablet by mouth daily.         Review of Systems:  Pertinent items are noted in HPI.  A comprehensive 10 point review of systems was completed.  Pertinent positives and negatives noted in the the HPI.        Objective:   /60 (BP Location: Left arm, Patient Position: Sitting, Cuff Size: adult)   Pulse 68   Temp 97.1 °F (36.2 °C) (Temporal)   Wt 152 lb (68.9 kg)   SpO2 98%   BMI 29.69 kg/m²  Estimated body mass index is 29.69 kg/m² as calculated from the following:    Height as of 11/4/24: 5' (1.524 m).    Weight as of this encounter: 152 lb (68.9 kg).  PHYSICAL EXAM:   General: no acute distress   Respiratory: no increased work of breathing; good air exchange; CTAB; no crackles or wheezing   Cardiovascular: RRR; S1, S2; no murmurs;  no edema   Neurological: awake, alert, oriented x3; CNII-XII grossly intact;  Behavioral/Psych: euthymic; appropriate affect       Assessment & Plan:   Megan was seen today for follow - up.    Diagnoses and all orders for  this visit:    Benign essential HTN  -controlled   -continue telmisartan    Hyperlipidemia, unspecified hyperlipidemia type  -     Lipid Panel [E]; Future  -     Comp Metabolic Panel (14) [E]; Future  -continue atorvastatin. Pending above will increase dose     Osteopenia, unspecified location  -supplements/exercise   -repeat DEXA in 2026              Jane Turner MD

## 2025-07-14 ENCOUNTER — OFFICE VISIT (OUTPATIENT)
Dept: INTERNAL MEDICINE CLINIC | Facility: CLINIC | Age: 70
End: 2025-07-14
Payer: MEDICARE

## 2025-07-14 ENCOUNTER — LAB ENCOUNTER (OUTPATIENT)
Dept: LAB | Age: 70
End: 2025-07-14
Attending: INTERNAL MEDICINE
Payer: MEDICARE

## 2025-07-14 VITALS
OXYGEN SATURATION: 98 % | DIASTOLIC BLOOD PRESSURE: 74 MMHG | BODY MASS INDEX: 28.86 KG/M2 | HEIGHT: 60 IN | HEART RATE: 70 BPM | TEMPERATURE: 97 F | WEIGHT: 147 LBS | RESPIRATION RATE: 12 BRPM | SYSTOLIC BLOOD PRESSURE: 118 MMHG

## 2025-07-14 DIAGNOSIS — R06.09 DOE (DYSPNEA ON EXERTION): ICD-10-CM

## 2025-07-14 DIAGNOSIS — K21.9 GASTROESOPHAGEAL REFLUX DISEASE, UNSPECIFIED WHETHER ESOPHAGITIS PRESENT: ICD-10-CM

## 2025-07-14 DIAGNOSIS — I10 BENIGN ESSENTIAL HTN: Chronic | ICD-10-CM

## 2025-07-14 DIAGNOSIS — R19.4 BOWEL HABIT CHANGES: ICD-10-CM

## 2025-07-14 DIAGNOSIS — R00.2 PALPITATIONS: ICD-10-CM

## 2025-07-14 DIAGNOSIS — R00.2 PALPITATIONS: Primary | ICD-10-CM

## 2025-07-14 LAB
ALBUMIN SERPL-MCNC: 4.4 G/DL (ref 3.2–4.8)
ALBUMIN/GLOB SERPL: 1.5 {RATIO} (ref 1–2)
ALP LIVER SERPL-CCNC: 72 U/L (ref 55–142)
ALT SERPL-CCNC: 59 U/L (ref 10–49)
ANION GAP SERPL CALC-SCNC: 9 MMOL/L (ref 0–18)
AST SERPL-CCNC: 32 U/L (ref ?–34)
BASOPHILS # BLD AUTO: 0.01 X10(3) UL (ref 0–0.2)
BASOPHILS NFR BLD AUTO: 0.2 %
BILIRUB SERPL-MCNC: 0.6 MG/DL (ref 0.2–1.1)
BUN BLD-MCNC: 10 MG/DL (ref 9–23)
CALCIUM BLD-MCNC: 9.4 MG/DL (ref 8.7–10.6)
CHLORIDE SERPL-SCNC: 106 MMOL/L (ref 98–112)
CO2 SERPL-SCNC: 27 MMOL/L (ref 21–32)
CREAT BLD-MCNC: 0.97 MG/DL (ref 0.55–1.02)
EGFRCR SERPLBLD CKD-EPI 2021: 62 ML/MIN/1.73M2 (ref 60–?)
EOSINOPHIL # BLD AUTO: 0.02 X10(3) UL (ref 0–0.7)
EOSINOPHIL NFR BLD AUTO: 0.5 %
ERYTHROCYTE [DISTWIDTH] IN BLOOD BY AUTOMATED COUNT: 13.2 %
FASTING STATUS PATIENT QL REPORTED: YES
GLOBULIN PLAS-MCNC: 2.9 G/DL (ref 2–3.5)
GLUCOSE BLD-MCNC: 97 MG/DL (ref 70–99)
HCT VFR BLD AUTO: 37.6 % (ref 35–48)
HGB BLD-MCNC: 12.6 G/DL (ref 12–16)
IMM GRANULOCYTES # BLD AUTO: 0.01 X10(3) UL (ref 0–1)
IMM GRANULOCYTES NFR BLD: 0.2 %
LYMPHOCYTES # BLD AUTO: 1.98 X10(3) UL (ref 1–4)
LYMPHOCYTES NFR BLD AUTO: 46.2 %
MCH RBC QN AUTO: 31.4 PG (ref 26–34)
MCHC RBC AUTO-ENTMCNC: 33.5 G/DL (ref 31–37)
MCV RBC AUTO: 93.8 FL (ref 80–100)
MONOCYTES # BLD AUTO: 0.35 X10(3) UL (ref 0.1–1)
MONOCYTES NFR BLD AUTO: 8.2 %
NEUTROPHILS # BLD AUTO: 1.92 X10 (3) UL (ref 1.5–7.7)
NEUTROPHILS # BLD AUTO: 1.92 X10(3) UL (ref 1.5–7.7)
NEUTROPHILS NFR BLD AUTO: 44.7 %
OSMOLALITY SERPL CALC.SUM OF ELEC: 293 MOSM/KG (ref 275–295)
PLATELET # BLD AUTO: 257 10(3)UL (ref 150–450)
POTASSIUM SERPL-SCNC: 4.2 MMOL/L (ref 3.5–5.1)
PROT SERPL-MCNC: 7.3 G/DL (ref 5.7–8.2)
RBC # BLD AUTO: 4.01 X10(6)UL (ref 3.8–5.3)
SODIUM SERPL-SCNC: 142 MMOL/L (ref 136–145)
TSI SER-ACNC: 3.59 UIU/ML (ref 0.55–4.78)
WBC # BLD AUTO: 4.3 X10(3) UL (ref 4–11)

## 2025-07-14 PROCEDURE — 36415 COLL VENOUS BLD VENIPUNCTURE: CPT

## 2025-07-14 PROCEDURE — 1159F MED LIST DOCD IN RCRD: CPT | Performed by: INTERNAL MEDICINE

## 2025-07-14 PROCEDURE — 80053 COMPREHEN METABOLIC PANEL: CPT

## 2025-07-14 PROCEDURE — 84443 ASSAY THYROID STIM HORMONE: CPT

## 2025-07-14 PROCEDURE — 3078F DIAST BP <80 MM HG: CPT | Performed by: INTERNAL MEDICINE

## 2025-07-14 PROCEDURE — 1170F FXNL STATUS ASSESSED: CPT | Performed by: INTERNAL MEDICINE

## 2025-07-14 PROCEDURE — 99214 OFFICE O/P EST MOD 30 MIN: CPT | Performed by: INTERNAL MEDICINE

## 2025-07-14 PROCEDURE — 3008F BODY MASS INDEX DOCD: CPT | Performed by: INTERNAL MEDICINE

## 2025-07-14 PROCEDURE — 3074F SYST BP LT 130 MM HG: CPT | Performed by: INTERNAL MEDICINE

## 2025-07-14 PROCEDURE — 85025 COMPLETE CBC W/AUTO DIFF WBC: CPT

## 2025-07-14 PROCEDURE — 1160F RVW MEDS BY RX/DR IN RCRD: CPT | Performed by: INTERNAL MEDICINE

## 2025-07-14 PROCEDURE — G2211 COMPLEX E/M VISIT ADD ON: HCPCS | Performed by: INTERNAL MEDICINE

## 2025-07-14 RX ORDER — OMEPRAZOLE 40 MG/1
40 CAPSULE, DELAYED RELEASE ORAL DAILY PRN
Qty: 90 CAPSULE | Refills: 0 | Status: SHIPPED | OUTPATIENT
Start: 2025-07-14

## 2025-07-14 NOTE — PROGRESS NOTES
Subjective:   Megan Dwyer is a 70 year old female  who presents for Stomach Pain (stomach pain x 3 weeks )     The following individual(s) verbally consented to be recorded using ambient AI listening technology and understand that they can each withdraw their consent to this listening technology at any point by asking the clinician to turn off or pause the recording:    Patient name: Megan Dwyer          History of Present Illness  Ms. Megan Dwyer is a 70 year old female who presents with gastrointestinal symptoms following a trip to [place].    She has experienced significant nausea, vomiting, and diarrhea beginning during her trip to [place] but has improved. However still has bloating sensation.There is no blood in stool, fever, or chills. She is not on any medication for these symptoms.    She experiences palpitations, especially during physical activity, leading to fatigue and shortness of breath. There is no chest pain, but she feels agitation and fatigue. Her blood pressure is normal.    History/Other:    Chief Complaint Reviewed and Verified  Nursing Notes Reviewed and   Verified  Tobacco Reviewed  Allergies Reviewed  Medications Reviewed    Medical History Reviewed  Surgical History Reviewed  Family History   Reviewed  Social History Reviewed         Current Medications[1]    Review of Systems:  Pertinent items are noted in HPI.  A comprehensive 10 point review of systems was completed.  Pertinent positives and negatives noted in the the HPI.        Objective:   /74   Pulse 70   Temp 96.7 °F (35.9 °C) (Temporal)   Resp 12   Ht 5' (1.524 m)   Wt 147 lb (66.7 kg)   SpO2 98%   BMI 28.71 kg/m²  Estimated body mass index is 28.71 kg/m² as calculated from the following:    Height as of this encounter: 5' (1.524 m).    Weight as of this encounter: 147 lb (66.7 kg).  PHYSICAL EXAM:   General: no acute distress   Respiratory: no increased work of breathing; good air exchange; CTAB; no crackles  or wheezing   Cardiovascular: RRR; S1, S2; no murmurs;   Gastrointestinal: normal bowel sounds; soft; non-distended; slightly tender on palpation in upper abdomen   Neurological: awake, alert, oriented x3; CNII-XII grossly intact;  Behavioral/Psych: euthymic; appropriate affect       Assessment & Plan:       Assessment & Plan  Gastrointestinal symptoms  Symptoms improved.  - Order stool test for H. pylori infection.  - omeprazole   (UTD with colonoscopy 11/2024; repeat recommended 11/2029)     Cardiac arrhythmia-possibly per report from pt but she is unclear if related to when she had GI symptoms.   However does report fatigue, and exertional dyspnea.   - Order electrocardiogram (ECG).  - Order stress test.  - Order ziopatch   - Refer to cardiologist.  -Discussed when to seek urgent medical attention; voiced understanding     Close follow-up       This note was created utilizing [employer] speech recognition software which may lead to grammatical errors/typos.   If any word or phrase is confusing, it is likely due to recognition error. Please ask the provider for clarification.      Jane Turner MD       [1]   Current Outpatient Medications   Medication Sig Dispense Refill    CALCIUM OR Take 1,000-1,200 mg by mouth daily.      atorvastatin 20 MG Oral Tab Take 1 tablet (20 mg total) by mouth nightly. 30 tablet 0    Telmisartan 80 MG Oral Tab Take 1 tablet (80 mg total) by mouth daily. 30 tablet 0    Ergocalciferol (VITAMIN D OR) Take 2,000 Units by mouth daily.      Ascorbic Acid (VITAMIN C OR) Take 1 tablet by mouth daily.

## 2025-07-15 ENCOUNTER — RESULTS FOLLOW-UP (OUTPATIENT)
Dept: INTERNAL MEDICINE CLINIC | Facility: CLINIC | Age: 70
End: 2025-07-15

## 2025-07-15 DIAGNOSIS — R79.89 ELEVATED LFTS: Primary | ICD-10-CM

## 2025-07-15 DIAGNOSIS — A04.8 H. PYLORI INFECTION: ICD-10-CM

## 2025-07-18 NOTE — PROGRESS NOTES
CARDIODIAGNOSTIC PRELIMINARY REPORT:    Spoke with patients  ,Sriram, and reviewed all pre Lexiscan instructions, verbalized understanding

## 2025-07-21 ENCOUNTER — HOSPITAL ENCOUNTER (OUTPATIENT)
Dept: CV DIAGNOSTICS | Facility: HOSPITAL | Age: 70
Discharge: HOME | End: 2025-07-21
Attending: INTERNAL MEDICINE
Payer: MEDICARE

## 2025-07-21 DIAGNOSIS — R06.09 DOE (DYSPNEA ON EXERTION): ICD-10-CM

## 2025-07-21 DIAGNOSIS — R00.2 PALPITATIONS: ICD-10-CM

## 2025-07-21 PROCEDURE — 93243 EXT ECG>48HR<7D SCAN A/R: CPT | Performed by: INTERNAL MEDICINE

## 2025-07-21 PROCEDURE — 93018 CV STRESS TEST I&R ONLY: CPT | Performed by: INTERNAL MEDICINE

## 2025-07-21 PROCEDURE — 93017 CV STRESS TEST TRACING ONLY: CPT | Performed by: INTERNAL MEDICINE

## 2025-07-21 PROCEDURE — 78452 HT MUSCLE IMAGE SPECT MULT: CPT | Performed by: INTERNAL MEDICINE

## 2025-07-21 PROCEDURE — 93242 EXT ECG>48HR<7D RECORDING: CPT | Performed by: INTERNAL MEDICINE

## 2025-07-21 RX ORDER — REGADENOSON 0.08 MG/ML
INJECTION, SOLUTION INTRAVENOUS
Status: COMPLETED
Start: 2025-07-21 | End: 2025-07-21

## 2025-07-21 RX ADMIN — REGADENOSON 0.4 MG: 0.08 INJECTION, SOLUTION INTRAVENOUS at 07:30:00

## 2025-07-22 ENCOUNTER — LAB ENCOUNTER (OUTPATIENT)
Dept: LAB | Age: 70
End: 2025-07-22
Attending: INTERNAL MEDICINE
Payer: MEDICARE

## 2025-07-22 DIAGNOSIS — K21.9 GASTROESOPHAGEAL REFLUX DISEASE, UNSPECIFIED WHETHER ESOPHAGITIS PRESENT: ICD-10-CM

## 2025-07-22 DIAGNOSIS — R19.4 BOWEL HABIT CHANGES: ICD-10-CM

## 2025-07-22 PROCEDURE — 87338 HPYLORI STOOL AG IA: CPT

## 2025-07-23 LAB
% OF MAX PREDICTED HR: 90 %
MAX DIASTOLIC BP: 62 MMHG
MAX HEART RATE: 100 BPM
MAX PREDICTED HEART RATE: 149 BPM
MAX SYSTOLIC BP: 128 MMHG
MAX WORK LOAD: 10

## 2025-07-24 LAB — H PYLORI AG STL QL IA: POSITIVE

## 2025-07-24 RX ORDER — TETRACYCLINE HYDROCHLORIDE 500 MG/1
500 CAPSULE ORAL 4 TIMES DAILY
Qty: 56 CAPSULE | Refills: 0 | Status: SHIPPED | OUTPATIENT
Start: 2025-07-24 | End: 2025-08-07

## 2025-07-24 RX ORDER — METRONIDAZOLE 500 MG/1
500 TABLET ORAL 3 TIMES DAILY
Qty: 42 TABLET | Refills: 0 | Status: SHIPPED | OUTPATIENT
Start: 2025-07-24 | End: 2025-08-07

## 2025-07-29 ENCOUNTER — TELEPHONE (OUTPATIENT)
Dept: INTERNAL MEDICINE CLINIC | Facility: CLINIC | Age: 70
End: 2025-07-29

## 2025-08-07 ENCOUNTER — OFFICE VISIT (OUTPATIENT)
Dept: INTERNAL MEDICINE CLINIC | Facility: CLINIC | Age: 70
End: 2025-08-07

## 2025-08-07 ENCOUNTER — ORDER TRANSCRIPTION (OUTPATIENT)
Dept: ADMINISTRATIVE | Facility: HOSPITAL | Age: 70
End: 2025-08-07

## 2025-08-07 VITALS
OXYGEN SATURATION: 96 % | WEIGHT: 148.63 LBS | HEART RATE: 66 BPM | TEMPERATURE: 98 F | BODY MASS INDEX: 28.06 KG/M2 | DIASTOLIC BLOOD PRESSURE: 56 MMHG | SYSTOLIC BLOOD PRESSURE: 130 MMHG | HEIGHT: 61 IN

## 2025-08-07 DIAGNOSIS — Z00.00 ENCOUNTER FOR PREVENTATIVE ADULT HEALTH CARE EXAMINATION: Primary | ICD-10-CM

## 2025-08-07 DIAGNOSIS — E78.2 MIXED HYPERLIPIDEMIA: Chronic | ICD-10-CM

## 2025-08-07 DIAGNOSIS — Z86.0100 HISTORY OF COLON POLYPS: ICD-10-CM

## 2025-08-07 DIAGNOSIS — M85.80 OSTEOPENIA, UNSPECIFIED LOCATION: ICD-10-CM

## 2025-08-07 DIAGNOSIS — I10 PRIMARY HYPERTENSION: Chronic | ICD-10-CM

## 2025-08-07 DIAGNOSIS — Z80.0 FAMILY HISTORY OF COLON CANCER: ICD-10-CM

## 2025-08-07 DIAGNOSIS — A04.8 H. PYLORI INFECTION: ICD-10-CM

## 2025-08-07 DIAGNOSIS — Z12.31 ENCOUNTER FOR SCREENING MAMMOGRAM FOR MALIGNANT NEOPLASM OF BREAST: ICD-10-CM

## 2025-08-07 DIAGNOSIS — Z13.6 SCREENING FOR CARDIOVASCULAR CONDITION: Primary | ICD-10-CM

## 2025-08-07 PROBLEM — M54.12 CERVICAL RADICULOPATHY: Status: RESOLVED | Noted: 2020-06-15 | Resolved: 2025-08-07

## 2025-08-07 PROCEDURE — 3008F BODY MASS INDEX DOCD: CPT | Performed by: INTERNAL MEDICINE

## 2025-08-07 PROCEDURE — 3078F DIAST BP <80 MM HG: CPT | Performed by: INTERNAL MEDICINE

## 2025-08-07 PROCEDURE — 1159F MED LIST DOCD IN RCRD: CPT | Performed by: INTERNAL MEDICINE

## 2025-08-07 PROCEDURE — 1170F FXNL STATUS ASSESSED: CPT | Performed by: INTERNAL MEDICINE

## 2025-08-07 PROCEDURE — 3075F SYST BP GE 130 - 139MM HG: CPT | Performed by: INTERNAL MEDICINE

## 2025-08-07 PROCEDURE — 1126F AMNT PAIN NOTED NONE PRSNT: CPT | Performed by: INTERNAL MEDICINE

## 2025-08-07 PROCEDURE — 96160 PT-FOCUSED HLTH RISK ASSMT: CPT | Performed by: INTERNAL MEDICINE

## 2025-08-07 PROCEDURE — G0438 PPPS, INITIAL VISIT: HCPCS | Performed by: INTERNAL MEDICINE

## 2025-08-07 RX ORDER — ATORVASTATIN CALCIUM 40 MG/1
40 TABLET, FILM COATED ORAL NIGHTLY
COMMUNITY

## 2025-08-08 ENCOUNTER — LAB ENCOUNTER (OUTPATIENT)
Dept: LAB | Age: 70
End: 2025-08-08
Attending: INTERNAL MEDICINE

## 2025-08-08 DIAGNOSIS — R06.09 DYSPNEA ON EXERTION: ICD-10-CM

## 2025-08-08 DIAGNOSIS — E78.00 PURE HYPERCHOLESTEROLEMIA: ICD-10-CM

## 2025-08-08 DIAGNOSIS — I25.10 CORONARY ATHEROSCLEROSIS OF NATIVE CORONARY ARTERY: Primary | ICD-10-CM

## 2025-08-08 LAB
ALBUMIN SERPL-MCNC: 4.5 G/DL (ref 3.2–4.8)
ALBUMIN/GLOB SERPL: 1.7 (ref 1–2)
ALP LIVER SERPL-CCNC: 58 U/L (ref 55–142)
ALT SERPL-CCNC: 75 U/L (ref 10–49)
ANION GAP SERPL CALC-SCNC: 11 MMOL/L (ref 0–18)
AST SERPL-CCNC: 42 U/L (ref ?–34)
BILIRUB SERPL-MCNC: 0.7 MG/DL (ref 0.2–1.1)
BUN BLD-MCNC: 9 MG/DL (ref 9–23)
CALCIUM BLD-MCNC: 9.7 MG/DL (ref 8.7–10.6)
CHLORIDE SERPL-SCNC: 107 MMOL/L (ref 98–112)
CHOLEST SERPL-MCNC: 116 MG/DL (ref ?–200)
CO2 SERPL-SCNC: 25 MMOL/L (ref 21–32)
CREAT BLD-MCNC: 0.84 MG/DL (ref 0.55–1.02)
EGFRCR SERPLBLD CKD-EPI 2021: 74 ML/MIN/1.73M2 (ref 60–?)
FASTING PATIENT LIPID ANSWER: YES
FASTING STATUS PATIENT QL REPORTED: YES
GLOBULIN PLAS-MCNC: 2.6 G/DL (ref 2–3.5)
GLUCOSE BLD-MCNC: 102 MG/DL (ref 70–99)
HDLC SERPL-MCNC: 34 MG/DL (ref 40–59)
LDLC SERPL CALC-MCNC: 58 MG/DL (ref ?–100)
NONHDLC SERPL-MCNC: 82 MG/DL (ref ?–130)
NT-PROBNP SERPL-MCNC: 93 PG/ML (ref ?–125)
OSMOLALITY SERPL CALC.SUM OF ELEC: 295 MOSM/KG (ref 275–295)
POTASSIUM SERPL-SCNC: 4.2 MMOL/L (ref 3.5–5.1)
PROT SERPL-MCNC: 7.1 G/DL (ref 5.7–8.2)
SODIUM SERPL-SCNC: 143 MMOL/L (ref 136–145)
TRIGL SERPL-MCNC: 136 MG/DL (ref 30–149)
VLDLC SERPL CALC-MCNC: 20 MG/DL (ref 0–30)

## 2025-08-08 PROCEDURE — 36415 COLL VENOUS BLD VENIPUNCTURE: CPT

## 2025-08-08 PROCEDURE — 80053 COMPREHEN METABOLIC PANEL: CPT

## 2025-08-08 PROCEDURE — 80061 LIPID PANEL: CPT

## 2025-08-08 PROCEDURE — 83880 ASSAY OF NATRIURETIC PEPTIDE: CPT

## 2025-08-11 ENCOUNTER — HOSPITAL ENCOUNTER (OUTPATIENT)
Dept: CT IMAGING | Age: 70
Discharge: HOME | End: 2025-08-11
Attending: INTERNAL MEDICINE

## 2025-08-11 DIAGNOSIS — Z13.6 SCREENING FOR CARDIOVASCULAR CONDITION: ICD-10-CM

## 2025-08-26 ENCOUNTER — LAB ENCOUNTER (OUTPATIENT)
Dept: LAB | Age: 70
End: 2025-08-26
Attending: INTERNAL MEDICINE

## 2025-08-26 DIAGNOSIS — A04.8 H. PYLORI INFECTION: ICD-10-CM

## 2025-08-26 PROCEDURE — 87338 HPYLORI STOOL AG IA: CPT

## 2025-08-28 LAB — H PYLORI AG STL QL IA: NEGATIVE

## (undated) DEVICE — FRAZIER SUCTION INSTRUMENT 10 FR W/CONTROL VENT & OBTURATOR: Brand: FRAZIER

## (undated) DEVICE — FLOSEAL HEMOSTATIC MATRIX, 5ML: Brand: FLOSEAL HEMOSTATIC MATRIX

## (undated) DEVICE — PEN: MARKING STD PT 100/CS: Brand: MEDICAL ACTION INDUSTRIES

## (undated) DEVICE — MAYFIELD® DISPOSABLE ADULT SKULL PIN (STAINLESS STEEL): Brand: MAYFIELD®

## (undated) DEVICE — ENCORE® LATEX MICRO SIZE 7, STERILE LATEX POWDER-FREE SURGICAL GLOVE: Brand: ENCORE

## (undated) DEVICE — 3.0MM NEURO (MATCH HEAD) SOFT TOUCH

## (undated) DEVICE — SOL  .9 1000ML BTL

## (undated) DEVICE — KIT DRN 1/8IN PVC 3 SPRG EVAC

## (undated) DEVICE — DRAPE SRG 150X54IN LEICA

## (undated) DEVICE — NON-ADHERENT PAD PREPACK: Brand: TELFA

## (undated) DEVICE — THE DTRAX® SPINAL SYSTEM IS A SET OF INSTRUMENTS INTENDED AND INDICATED FOR ACCESS AND PREPARATION OF A SPINAL JOINT TO AID IN FUSION.: Brand: DTRAX® SPINAL SYSTEM

## (undated) DEVICE — C-ARMOR C-ARM EQUIPMENT COVERS CLEAR STERILE UNIVERSAL FIT 12 PER CASE: Brand: C-ARMOR

## (undated) DEVICE — BAG SRG CLR 36X30IN

## (undated) DEVICE — HEX-LOCKING BLADE ELECTRODE: Brand: EDGE

## (undated) DEVICE — SUTURE MONOCRYL 4-0 Y845G

## (undated) DEVICE — DRESSING BIOPATCH 1X4 CNTR

## (undated) DEVICE — 3M™ TEGADERM™ TRANSPARENT FILM DRESSING, 1626W, 4 IN X 4-3/4 IN (10 CM X 12 CM), 50 EACH/CARTON, 4 CARTON/CASE: Brand: 3M™ TEGADERM™

## (undated) DEVICE — SUTURE VICRYL 2-0 CT-2

## (undated) DEVICE — DRAPE SHEET LG

## (undated) DEVICE — 3.0MM COARSE DIAMOND, EXTENDED

## (undated) DEVICE — COLLAR CRV ADLT REG CHIN REST

## (undated) DEVICE — SUTURE VICRYL 2-0 CT-1

## (undated) DEVICE — VIOLET BRAIDED (POLYGLACTIN 910), SYNTHETIC ABSORBABLE SUTURE: Brand: COATED VICRYL

## (undated) DEVICE — GAUZE SPONGES,12 PLY: Brand: CURITY

## (undated) DEVICE — GAMMEX® PI HYBRID SIZE 9, STERILE POWDER-FREE SURGICAL GLOVE, POLYISOPRENE AND NEOPRENE BLEND: Brand: GAMMEX

## (undated) DEVICE — LAMINECTOMY: Brand: MEDLINE INDUSTRIES, INC.

## (undated) NOTE — LETTER
Hospital Discharge Documentation  Please phone to schedule a hospital follow up appointment.     From: [id number] Hospitalist's Office  Phone: [phone]    Patient discharged time/date: 6/16/2020  4:05 PM  Patient discharge disposition:  Home or Self Neurologic: No focal neurological deficits. Musculoskeletal: Moves all extremities.     Hospital Course:   Cervical radiculopathy  S/P C3-4 posterior facet fusion,   PAIN CONTROL WITH PERCOCET AND ZOFRAN-SCRIPTS PRINTED BY NEUROSURGERY,   NEURO CHECKS, The medication that you received for sedation or general anesthesia can last up to 24 hours. Your judgment and reflexes may be altered, even if you feel like your normal self.       We Recommend:   · Do not drive any motor vehicle or bicycle   · Avoid mowin Electronically signed by Alvarado Hoffmann MD on 6/16/2020  2:56 PM   [employer]     [address] 1 - Alvarado Hoffmann MD on 6/16/2020  2:51 PM   . 2 - Alvarado Hoffmann MD on 6/16/2020  2:52 PM

## (undated) NOTE — ED AVS SNAPSHOT
Ms. Ingrid Hernandez   MRN: [id number]    Department:  [facility] Emergency Department   Date of Visit:  11/18/2019           Disclosure     Insurance plans vary and the physician(s) referred by the ER may not be covered by your plan.  Please contact y tell this physician (or your personal doctor if your instructions are to return to your personal doctor) about any new or lasting problems. The primary care or specialist physician will see patients referred from the [facility] Emergency Department.  Roxanna Munguia

## (undated) NOTE — ED AVS SNAPSHOT
Timothy Tracey   MRN: [id number]    Department:  [facility] Emergency Department in [place]   Date of Visit:  2/20/2019           Disclosure     Insurance plans vary and the physician(s) referred by the ER may not be covered by your plan.  Please contact y tell this physician (or your personal doctor if your instructions are to return to your personal doctor) about any new or lasting problems. The primary care or specialist physician will see patients referred from the [facility] Emergency Department.  Arthor Bernheim